# Patient Record
Sex: FEMALE | Race: WHITE
[De-identification: names, ages, dates, MRNs, and addresses within clinical notes are randomized per-mention and may not be internally consistent; named-entity substitution may affect disease eponyms.]

---

## 2018-12-30 ENCOUNTER — HOSPITAL ENCOUNTER (INPATIENT)
Dept: HOSPITAL 92 - ERS | Age: 78
LOS: 7 days | Discharge: HOSPICE | DRG: 64 | End: 2019-01-06
Attending: INTERNAL MEDICINE | Admitting: INTERNAL MEDICINE
Payer: MEDICARE

## 2018-12-30 VITALS — BODY MASS INDEX: 24.2 KG/M2

## 2018-12-30 DIAGNOSIS — Z66: ICD-10-CM

## 2018-12-30 DIAGNOSIS — N30.00: ICD-10-CM

## 2018-12-30 DIAGNOSIS — I13.2: ICD-10-CM

## 2018-12-30 DIAGNOSIS — E03.9: ICD-10-CM

## 2018-12-30 DIAGNOSIS — R00.1: ICD-10-CM

## 2018-12-30 DIAGNOSIS — N18.6: ICD-10-CM

## 2018-12-30 DIAGNOSIS — G93.41: ICD-10-CM

## 2018-12-30 DIAGNOSIS — Z86.73: ICD-10-CM

## 2018-12-30 DIAGNOSIS — R56.9: ICD-10-CM

## 2018-12-30 DIAGNOSIS — E85.4: ICD-10-CM

## 2018-12-30 DIAGNOSIS — Z91.15: ICD-10-CM

## 2018-12-30 DIAGNOSIS — Z99.2: ICD-10-CM

## 2018-12-30 DIAGNOSIS — D64.9: ICD-10-CM

## 2018-12-30 DIAGNOSIS — I50.33: ICD-10-CM

## 2018-12-30 DIAGNOSIS — E11.65: ICD-10-CM

## 2018-12-30 DIAGNOSIS — Z79.82: ICD-10-CM

## 2018-12-30 DIAGNOSIS — I63.9: Primary | ICD-10-CM

## 2018-12-30 DIAGNOSIS — Z90.49: ICD-10-CM

## 2018-12-30 DIAGNOSIS — I82.611: ICD-10-CM

## 2018-12-30 DIAGNOSIS — Z51.5: ICD-10-CM

## 2018-12-30 DIAGNOSIS — I24.8: ICD-10-CM

## 2018-12-30 DIAGNOSIS — I61.3: ICD-10-CM

## 2018-12-30 DIAGNOSIS — E11.22: ICD-10-CM

## 2018-12-30 DIAGNOSIS — I48.0: ICD-10-CM

## 2018-12-30 DIAGNOSIS — J96.01: ICD-10-CM

## 2018-12-30 DIAGNOSIS — Z79.4: ICD-10-CM

## 2018-12-30 DIAGNOSIS — E78.5: ICD-10-CM

## 2018-12-30 LAB
ALBUMIN SERPL BCG-MCNC: 3 G/DL (ref 3.4–4.8)
ALP SERPL-CCNC: 142 U/L (ref 40–150)
ALT SERPL W P-5'-P-CCNC: 25 U/L (ref 8–55)
ANALYZER IN CARDIO: (no result)
ANION GAP SERPL CALC-SCNC: 23 MMOL/L (ref 10–20)
APAP SERPL-MCNC: (no result) MCG/ML (ref 10–30)
APTT PPP: 24.7 SEC (ref 22.9–36.1)
AST SERPL-CCNC: 25 U/L (ref 5–34)
BACTERIA UR QL AUTO: (no result) HPF
BASE EXCESS STD BLDA CALC-SCNC: -2.8 MEQ/L
BASOPHILS # BLD AUTO: 0 THOU/UL (ref 0–0.2)
BASOPHILS NFR BLD AUTO: 0.3 % (ref 0–1)
BILIRUB SERPL-MCNC: 0.6 MG/DL (ref 0.2–1.2)
BUN SERPL-MCNC: 81 MG/DL (ref 9.8–20.1)
CA-I BLDA-SCNC: 1.26 MMOL/L (ref 1.12–1.3)
CALCIUM SERPL-MCNC: 8.4 MG/DL (ref 7.8–10.44)
CHLORIDE SERPL-SCNC: 98 MMOL/L (ref 98–107)
CK MB SERPL-MCNC: 7.3 NG/ML (ref 0–6.6)
CK SERPL-CCNC: 91 U/L (ref 29–168)
CO2 SERPL-SCNC: 20 MMOL/L (ref 23–31)
CREAT CL PREDICTED SERPL C-G-VRATE: 0 ML/MIN (ref 70–130)
CRYSTAL-AUWI FLAG: 0.2 (ref 0–15)
DRUG SCREEN CUTOFF: (no result)
EOSINOPHIL # BLD AUTO: 1.3 THOU/UL (ref 0–0.7)
EOSINOPHIL NFR BLD AUTO: 12.7 % (ref 0–10)
GLOBULIN SER CALC-MCNC: 2.6 G/DL (ref 2.4–3.5)
GLUCOSE SERPL-MCNC: 67 MG/DL (ref 83–110)
GLUCOSE UR STRIP-MCNC: 250 MG/DL
HCO3 BLDA-SCNC: 22.4 MEQ/L (ref 22–28)
HCT VFR BLDA CALC: 34 % (ref 36–47)
HEV IGM SER QL: 0 (ref 0–7.99)
HGB BLD-MCNC: 13 G/DL (ref 12–16)
HGB BLDA-MCNC: 11.7 G/DL (ref 12–16)
HYALINE CASTS #/AREA URNS LPF: (no result) LPF
INR PPP: 0.9
LIPASE SERPL-CCNC: 16 U/L (ref 8–78)
LYMPHOCYTES # BLD: 0.6 THOU/UL (ref 1.2–3.4)
LYMPHOCYTES NFR BLD AUTO: 5.6 % (ref 21–51)
MCH RBC QN AUTO: 31.1 PG (ref 27–31)
MCV RBC AUTO: 101 FL (ref 78–98)
MEDTOX CONTROL LINE VALID?: (no result)
MEDTOX READER #: (no result)
MONOCYTES # BLD AUTO: 0.9 THOU/UL (ref 0.11–0.59)
MONOCYTES NFR BLD AUTO: 9.2 % (ref 0–10)
NEUTROPHILS # BLD AUTO: 7.2 THOU/UL (ref 1.4–6.5)
NEUTROPHILS NFR BLD AUTO: 72.2 % (ref 42–75)
O2 A-A PPRESDIFF RESPIRATORY: 64.89 MM[HG] (ref 0–20)
PATHC CAST-AUWI FLAG: 9.74 (ref 0–2.49)
PCO2 BLDA: 40.3 MMHG (ref 35–45)
PH BLDA: 7.36 [PH] (ref 7.35–7.45)
PLATELET # BLD AUTO: 261 THOU/UL (ref 130–400)
PO2 BLDA: 112.9 MMHG (ref 70–?)
POTASSIUM BLD-SCNC: 3.9 MMOL/L (ref 3.7–5.3)
POTASSIUM SERPL-SCNC: 4.4 MMOL/L (ref 3.5–5.1)
PROT UR STRIP.AUTO-MCNC: 300 MG/DL
PROTHROMBIN TIME: 12.6 SEC (ref 12–14.7)
RBC # BLD AUTO: 4.18 MILL/UL (ref 4.2–5.4)
RBC # FLD AUTO: 0 /CUMM
RBC UR QL AUTO: (no result) HPF (ref 0–3)
SALICYLATES SERPL-MCNC: (no result) MG/DL (ref 15–30)
SODIUM SERPL-SCNC: 137 MMOL/L (ref 136–145)
SP GR UR STRIP: 1.01 (ref 1–1.04)
SPECIMEN DRAWN FROM PATIENT: (no result)
SPERM-AUWI FLAG: 0 (ref 0–9.9)
TROPONIN I SERPL DL<=0.01 NG/ML-MCNC: 0.06 NG/ML (ref ?–0.03)
TROPONIN I SERPL DL<=0.01 NG/ML-MCNC: 0.07 NG/ML (ref ?–0.03)
WBC # BLD AUTO: 9.9 THOU/UL (ref 4.8–10.8)
WBC # FLD MANUAL: 0 /CUMM
YEAST-AUWI FLAG: 0 (ref 0–25)

## 2018-12-30 PROCEDURE — 93306 TTE W/DOPPLER COMPLETE: CPT

## 2018-12-30 PROCEDURE — 86225 DNA ANTIBODY NATIVE: CPT

## 2018-12-30 PROCEDURE — 93970 EXTREMITY STUDY: CPT

## 2018-12-30 PROCEDURE — 96368 THER/DIAG CONCURRENT INF: CPT

## 2018-12-30 PROCEDURE — 36416 COLLJ CAPILLARY BLOOD SPEC: CPT

## 2018-12-30 PROCEDURE — 96375 TX/PRO/DX INJ NEW DRUG ADDON: CPT

## 2018-12-30 PROCEDURE — 84481 FREE ASSAY (FT-3): CPT

## 2018-12-30 PROCEDURE — 3E1M39Z IRRIGATION OF PERITONEAL CAVITY USING DIALYSATE, PERCUTANEOUS APPROACH: ICD-10-PCS | Performed by: INTERNAL MEDICINE

## 2018-12-30 PROCEDURE — 96361 HYDRATE IV INFUSION ADD-ON: CPT

## 2018-12-30 PROCEDURE — 81015 MICROSCOPIC EXAM OF URINE: CPT

## 2018-12-30 PROCEDURE — S0028 INJECTION, FAMOTIDINE, 20 MG: HCPCS

## 2018-12-30 PROCEDURE — 84484 ASSAY OF TROPONIN QUANT: CPT

## 2018-12-30 PROCEDURE — 80061 LIPID PANEL: CPT

## 2018-12-30 PROCEDURE — 96376 TX/PRO/DX INJ SAME DRUG ADON: CPT

## 2018-12-30 PROCEDURE — G0257 UNSCHED DIALYSIS ESRD PT HOS: HCPCS

## 2018-12-30 PROCEDURE — 84439 ASSAY OF FREE THYROXINE: CPT

## 2018-12-30 PROCEDURE — 80048 BASIC METABOLIC PNL TOTAL CA: CPT

## 2018-12-30 PROCEDURE — 83690 ASSAY OF LIPASE: CPT

## 2018-12-30 PROCEDURE — 71045 X-RAY EXAM CHEST 1 VIEW: CPT

## 2018-12-30 PROCEDURE — 87077 CULTURE AEROBIC IDENTIFY: CPT

## 2018-12-30 PROCEDURE — 85730 THROMBOPLASTIN TIME PARTIAL: CPT

## 2018-12-30 PROCEDURE — 96367 TX/PROPH/DG ADDL SEQ IV INF: CPT

## 2018-12-30 PROCEDURE — 94760 N-INVAS EAR/PLS OXIMETRY 1: CPT

## 2018-12-30 PROCEDURE — 84443 ASSAY THYROID STIM HORMONE: CPT

## 2018-12-30 PROCEDURE — 94640 AIRWAY INHALATION TREATMENT: CPT

## 2018-12-30 PROCEDURE — 70450 CT HEAD/BRAIN W/O DYE: CPT

## 2018-12-30 PROCEDURE — 82553 CREATINE MB FRACTION: CPT

## 2018-12-30 PROCEDURE — 87186 SC STD MICRODIL/AGAR DIL: CPT

## 2018-12-30 PROCEDURE — C1769 GUIDE WIRE: HCPCS

## 2018-12-30 PROCEDURE — 89051 BODY FLUID CELL COUNT: CPT

## 2018-12-30 PROCEDURE — G0365 VESSEL MAPPING HEMO ACCESS: HCPCS

## 2018-12-30 PROCEDURE — 70551 MRI BRAIN STEM W/O DYE: CPT

## 2018-12-30 PROCEDURE — 82805 BLOOD GASES W/O2 SATURATION: CPT

## 2018-12-30 PROCEDURE — 87040 BLOOD CULTURE FOR BACTERIA: CPT

## 2018-12-30 PROCEDURE — 93005 ELECTROCARDIOGRAM TRACING: CPT

## 2018-12-30 PROCEDURE — 85014 HEMATOCRIT: CPT

## 2018-12-30 PROCEDURE — 82140 ASSAY OF AMMONIA: CPT

## 2018-12-30 PROCEDURE — C1752 CATH,HEMODIALYSIS,SHORT-TERM: HCPCS

## 2018-12-30 PROCEDURE — 85049 AUTOMATED PLATELET COUNT: CPT

## 2018-12-30 PROCEDURE — 51703 INSERT BLADDER CATH COMPLEX: CPT

## 2018-12-30 PROCEDURE — 87340 HEPATITIS B SURFACE AG IA: CPT

## 2018-12-30 PROCEDURE — 95819 EEG AWAKE AND ASLEEP: CPT

## 2018-12-30 PROCEDURE — 85018 HEMOGLOBIN: CPT

## 2018-12-30 PROCEDURE — 87205 SMEAR GRAM STAIN: CPT

## 2018-12-30 PROCEDURE — 84100 ASSAY OF PHOSPHORUS: CPT

## 2018-12-30 PROCEDURE — 36556 INSERT NON-TUNNEL CV CATH: CPT

## 2018-12-30 PROCEDURE — 80307 DRUG TEST PRSMV CHEM ANLYZR: CPT

## 2018-12-30 PROCEDURE — 87086 URINE CULTURE/COLONY COUNT: CPT

## 2018-12-30 PROCEDURE — 83735 ASSAY OF MAGNESIUM: CPT

## 2018-12-30 PROCEDURE — 81003 URINALYSIS AUTO W/O SCOPE: CPT

## 2018-12-30 PROCEDURE — 36415 COLL VENOUS BLD VENIPUNCTURE: CPT

## 2018-12-30 PROCEDURE — 87070 CULTURE OTHR SPECIMN AEROBIC: CPT

## 2018-12-30 PROCEDURE — 95816 EEG AWAKE AND DROWSY: CPT

## 2018-12-30 PROCEDURE — 90945 DIALYSIS ONE EVALUATION: CPT

## 2018-12-30 PROCEDURE — 85025 COMPLETE CBC W/AUTO DIFF WBC: CPT

## 2018-12-30 PROCEDURE — 82550 ASSAY OF CK (CPK): CPT

## 2018-12-30 PROCEDURE — 83880 ASSAY OF NATRIURETIC PEPTIDE: CPT

## 2018-12-30 PROCEDURE — 80306 DRUG TEST PRSMV INSTRMNT: CPT

## 2018-12-30 PROCEDURE — 96365 THER/PROPH/DIAG IV INF INIT: CPT

## 2018-12-30 PROCEDURE — 70544 MR ANGIOGRAPHY HEAD W/O DYE: CPT

## 2018-12-30 PROCEDURE — 83605 ASSAY OF LACTIC ACID: CPT

## 2018-12-30 PROCEDURE — 85610 PROTHROMBIN TIME: CPT

## 2018-12-30 PROCEDURE — 86038 ANTINUCLEAR ANTIBODIES: CPT

## 2018-12-30 PROCEDURE — 80053 COMPREHEN METABOLIC PANEL: CPT

## 2018-12-30 PROCEDURE — 84146 ASSAY OF PROLACTIN: CPT

## 2018-12-30 RX ADMIN — INSULIN LISPRO PRN UNIT: 100 INJECTION, SOLUTION INTRAVENOUS; SUBCUTANEOUS at 23:18

## 2018-12-30 RX ADMIN — MEROPENEM SCH MLS: 1 INJECTION, POWDER, FOR SOLUTION INTRAVENOUS at 17:45

## 2018-12-30 NOTE — CON
DATE OF CONSULTATION:  2018



SERVICE:  Pulmonary Medicine.



REASON FOR CONSULT:  ICU patient.



HISTORY OF PRESENT ILLNESS:  The patient is a 78-year-old white female with past

medical history significant for end-stage renal disease, requiring peritoneal

dialysis.  She has been undergoing this at home on a nightly basis.  She was in 
her

usual state of health until she recently had a couple of large strokes and was

inpatient for 21 days at [facility].  She was discharged to rehabilitation

recently.  At the rehabilitation center, she was able to walk a few steps with

significant assistance.  Recently, she transitioned home.  When she got home, 
she

went to bed in her usual state of health.  In the morning, she could not be 
woken

up.  As such, she was subsequently brought back to the Emergency Department.  
She

cannot provide any additional elements of the history.  Multiple laboratories 
and

imaging studies have been performed so far. 



PAST MEDICAL HISTORY:  

1. Paroxysmal atrial fibrillation.

2. End-stage renal disease, on peritoneal dialysis.

3. Type 2 diabetes mellitus.

4. History of CVA.

5. Dyslipidemia.

6. Hypertension.

7. Hypothyroidism.



PAST SURGICAL HISTORY:  

1. Cataract surgery.

2. Kyphoplasty.

3. Right femur fracture repair.

4. Vertebroplasty.



FAMILY HISTORY:  Noncontributory.



SOCIAL HISTORY:  Negative for alcohol, tobacco, or illicit drug use.  She has no

exposure to chemicals, dust, asbestos, or tuberculosis. 



ALLERGIES:  IODINE.



MEDICATIONS:  List of her inpatient medications was reviewed.  No specific 
updates

were made at this time. 



REVIEW OF SYSTEMS:  Cannot be obtained as the patient has encephalopathy 
presently.



PHYSICAL EXAMINATION:

VITAL SIGNS:  Afebrile, pulse 53, blood pressure 135/57, respirations 14, and

saturation 100% on 2 L via the nasal cannula through nasal trumpet. 

HEENT:  Normocephalic and atraumatic.  Sclerae white.  Conjunctivae pink.  Oral

mucosa is moist and without lesions. 

LUNGS:  Decent air entry.  There is no prolonged expiratory phase or wheezing. 

HEART:  Normal rate, regular. 

ABDOMEN:  Soft.  No tenderness to palpation.  I do not appreciate rebound or

guarding.  Bowel sounds are hypoactive. 

GENITOURINARY:  Parrish catheter in place with very purulent sediment. 

MUSCULOSKELETAL:  No cyanosis or clubbing.  There is no pitting in the bilateral

lower extremities. 

NEUROLOGIC:  Pupils are equal, round, and reactive to light.  She does attend 
with

significant stimulation.  I witnessed the shaking spell that had an intermittent

contraction followed by relaxation, which was very quick.  It was predominantly

displayed in the right upper extremity, but seemed to go into the left upper

extremity as well.  She withdraws from noxious stimuli in a left lower 
extremity.

She did not withdraw from noxious stimuli in the right lower extremity.  
Babinski

are neutral.  She withdraws from noxious stimuli in the bilateral upper 
extremities.

 She is breathing comfortably.  She demonstrates a cough. 



LABORATORY DATA:  WBC 9.9, hemoglobin 13.0, and platelets 261,000.  Neutrophils 
are

72%.  Eosinophil level is 12.7%.  INR 0.9.  A pH of 7.36, pCO2 of 40, and pO2 of

112.  Creatinine 10.08, BUN 80, anion gap 23, bicarb 20.  Basic metabolic 
profile

and liver function studies are otherwise unremarkable.  , which is in

historic high.  TSH 5.7 and prolactin 918.  Lactate is unremarkable.  
Urinalysis is

positive for polyuria and leukocyte esterase.  Nitrites are negative, but there 
is

4+ bacteria.  Benzodiazepines are positive on the urine drug screen.  Alcohol,

acetaminophen, and salicylates are negative. 



IMAGIN. MRA of the head demonstrates no significant stenoses.

2. Chest x-ray demonstrates a right IJ that terminates in the right atrium.

Otherwise, I do not see any acute lung issues.  Cardiac silhouette is generous 
on

this portable film.  There are no overt consolidating changes.  There is no

significant pulmonary vascular congestion. 

3. CT of the brain demonstrates no acute intracranial abnormality.  Sinusitis 
is a

possibility here. 



ASSESSMENT:  

1. Severe sepsis.

2. Urinary tract infection, suspected.

3. Sinusitis.

4. Bradyarrhythmia.

5. Status epilepticus, suspected.

6. Hypothyroidism.

7. End-stage renal disease.

8. Hypereosinophilia.

9. Hypoglycemia.



DISCUSSION AND PLAN:  It is truly not clear what is going on.  I do believe we 
are

dealing with a seizure issue.  She has already been loaded with Keppra.  We are

going to need to give her some Ativan in order to calmed down these movement

changes.  We are going to schedule steroids q.6 hours for the elevated 
eosinophils.

This will be continued.  The patient basically had jocelin pyuria.  As such, a 
sample

is going to be sent off.  When we connect to peritoneal dialysis, we will send 
some

peritoneal fluid for evaluation as well.  I agree with empiric antibiotics.  I 
will

check a free T3 and a free T4 level.  Repeat a prolactin level in the morning 
to make certain that this

is going down.  I will add a magnesium phosphorus to morning laboratories.

Otherwise, supportive care is going to be 

continued.  Cardiology has put the patient on both dopamine, and dobutamine in 
order

to facilitate cardiac output and maintain a heart rate in the 50s.  Meropenem 
will

be initiated and all centrally acting medications will be interrupted.

Pulmonary/Critical Care will continue to follow very closely during this 
hospital

stay. 



70 minutes have been devoted to this patient in various activities.  I 
personally reviewed all imaging studies and laboratory data noted within this 
document.  For fifty percent of this time, I was interacting with the patient 
at the bedside or coordinating care with the care team.  For the remainder of 
the time I was immediately available to the patient in the hospital unit.  



Job ID:  [id number]



MTDD

## 2018-12-30 NOTE — CON
DATE OF CONSULTATION:  



TYPE OF CONSULTATION:  Nephrology.



REASON FOR CONSULTATION:  End-stage renal disease on maintenance peritoneal dialysis.



HISTORY OF PRESENT ILLNESS:  This is a 78-year-old female, who cannot give any

history, presented to the hospital with altered mentation.  The patient tolerated

dialysis well, done by her . 



PAST MEDICAL HISTORY:  Significant for diabetes mellitus.



REVIEW OF SYSTEMS:  Unobtainable. 

__________



ALLERGIES:  REVIEWED.



HOME MEDICATION LIST:  Unavailable.



PHYSICAL EXAMINATION:

GENERAL:  The patient is resting in mild-to-moderate distress. 

VITAL SIGNS:  Afebrile, pulse 70, breathing 16, and blood pressure 130/70. 

GENERAL APPEARANCE AND MENTAL STATUS:  Fair. 

HEAD/NECK:  Normocephalic.   Atraumatic. 

EYES:  EOMI.  No deformity. 

EARS:  Clear.  No ulcers. 

NOSE:   Intact.  No lesions. 

MOUTH:  Clear.  No discharge. 

THROAT:  Clear.  No exudate. 

LUNGS:   Clear.  No crackles. 

CARDIAC:   S1, S2.  No rub. 

ABDOMEN:   Benign.  Bowel sounds positive. 

GENITALIA/RECTUM:  Parrish absent. 

BACK/EXTREMITIES:  Edema 0+. 

NEUROLOGICAL:  The patient is comatose. 

SKIN:   

LYMPHATICS:



LABORATORY DATA:  Labs show hemoglobin 13.  Potassium is 4.4.



ASSESSMENT AND PLAN:  Stage 6 chronic kidney disease, plan peritoneal dialysis.

Hypertension stable.  Anemia stable.  Altered mental status, management per primary

team.  Prognosis is extremely poor. 







Job ID:  [id number]

## 2018-12-30 NOTE — RAD
PORTABLE AP CHEST XRAY:

 

DATE: 12/30/2018.

 

HISTORY: 

Altered mental status.  The patient came in unresponsive per EMS.

 

COMPARISON: 

9/18/2012.

 

FINDINGS: 

Two separate pacing pads overlie the right chest and centrally.  Cardiac silhouette is magnified by p
rojection but is enlarged.  Pulmonary vasculature is within normal limits.  Lungs are otherwise clear
.  Vascular calcification of the thoracic aorta.  Vertebroplasty changes are seen involving upper lum
bar vertebral body.  No other interval change.

 

IMPRESSION: 

1.  No acute cardiopulmonary process.

 

2.  Cardiomegaly.

 

POS: [facility]

## 2018-12-30 NOTE — RAD
CHEST 1 VIEW:

 

HISTORY: 

Central line placement.

 

COMPARISON: 

Earlier exam on the same date.

 

FINDINGS: 

The tip of a right internal jugular central venous catheter overlies the cavoatrial junction.  No justine
dence of pneumothorax.

 

Cardiomegaly and other findings are stable.

 

IMPRESSION: 

Right internal jugular central venous catheter is in good radiographic position.

 

POS: [facility]

## 2018-12-30 NOTE — MRI
MRA OF THE [place] OF RAI AND VERTEBRAL BASILAR SYSTEM WITH 3D RECONSTRUCTIONS:

 

Date: 12-30-18

 

History: Altered mental status. 

 

FINDINGS: 

There is mild motion artifact present on provided images. There is diminished flow related enhancemen
t involving the distal bilateral vertebral arteries, but this is a symmetric finding and is most like
ly artifactual. The distal vertebral arteries are otherwise codominant and patent bilaterally. The ba
silar artery and bilateral cerebral arteries are patent. Prominent posterior communicating artery on 
the right is present. Bilateral anterior cerebral and middle cerebral arteries appear patent. No foca
l stenosis or branch occlusion is identified. 

 

No aneurysm is seen within the limitations of the technique of this exam. There is an area of signal 
hyperintensity seen in the region of the sphenoid sinus. There is mucosal thickening present within t
he left sphenoid sinus with thickening of the walls of the left sphenoid sinus on CT examination and 
findings are likely related to chronic sinus disease. 

 

IMPRESSION: 

1. No focal stenosis or branch occlusion is seen involving the [place] or vertebral basilar 
system. 

2. Sinus disease involving the left sphenoid sinus, better visualized on noncontrast CT head. 

 

POS: [facility]

## 2018-12-30 NOTE — CON
DATE OF CONSULTATION:  



REASON FOR CONSULTATION:  Bradycardia.



HISTORY OF PRESENT ILLNESS:  Ms. De La Cruz is a unfortunate 78-year-old woman with a

history of end-stage renal disease.  According to her , she had a very large

stroke while at [facility].  She received a blood transfusion for her chronic

anemia and while she was there for history, she had a stroke. 



He states she was in rehab and recently came home.  He then states he found her

unresponsive.  He was unsure whether there were any focal neurologic deficits.  EMS

was summoned.  She was seen and evaluated in the emergency room by myself.  The

patient initially had a heart rate in the 20s to 30s.  She was placed on external

pacing with EMS.  Upon arrival to the ER, heart rate was in the 40s.  She did

respond to atropine.  Her rhythm appeared to be a narrow complex.  She was also

found to be in sinus. 



PAST MEDICAL HISTORY:  Paroxysmal atrial fibrillation, diabetes mellitus, previous

CVA, hyperlipidemia, hypertension, hypothyroidism, diabetes mellitus. 



PAST SURGICAL HISTORY:  Kyphoplasty, femoral fracture repair, vertebroplasty,

cataract surgery. 



SOCIAL HISTORY:  No current tobacco or alcohol use.



ALLERGIES:  IODINE.



REVIEW OF SYSTEMS:  Not obtainable.  She is currently obtunded.



PHYSICAL EXAMINATION:

VITAL SIGNS:  Blood pressure 134/54, pulse 77, temperature afebrile. 

GENERAL:  She is currently obtunded.  She does have difficulty opening up her eyes

to command.  She does follow commands, moving her right and left hand and lower

extremities, but appears markedly weak. 

NEUROLOGIC:  The patient is alert and oriented x3 with no focal neurologic deficits. 

HEENT:  Sclerae without icterus.  Mouth has moist mucous membranes with normal

pallor. 

NECK:  No JVD.  Carotid upstroke brisk.  No bruits bilaterally. 

LUNGS:  Clear to auscultation with unlabored respirations. 

BACK:  No scoliosis or kyphosis. 

CARDIAC:  Regular rate and rhythm with normal S1 and S2.  No S3 or S4 noted.  No

significant rubs, murmurs, thrills, or gallops noted throughout the precordium.  PMI

is not displaced.  There is no parasternal heave. 

ABDOMEN:  Soft, nontender, nondistended.  No peritoneal signs present.  No

hepatosplenomegaly.  No abnormal striae. 

EXTREMITIES:  2+ femoral and 2+ dorsalis pedis pulses.  No cyanosis, clubbing, or

edema. 

SKIN:  No gross abnormalities.



PERTINENT LABORATORY DATA:  Hemoglobin 13.0.  Peak troponin 0.072 and it is

downtrending.  Creatinine 10.08, CO2 is 20, CK-MB is 7.3. 



EKG shows sinus bradycardia with a narrow complex.



IMPRESSION:  

1. Bradycardia.

2. Mental status changes.

3. End-stage renal disease.

4. Paroxysmal atrial fibrillation.



RECOMMENDATIONS:  At this point, I would recommend adding dopamine and dobutamine.

She has a narrow complex and did respond to atropine.  She will likely respond to

dopamine and dobutamine.  At this point, I do not feel a temporary pacemaker is

indicated.  I would like to further assess why she is currently obtunded.  MRI

report is currently pending.  She was seen and evaluated in the emergency room.  The

 wishes for no heroic means or measures. 



I did spend 40 minutes of critical care time at the patient's bedside assessing the

patient's status. 







Job ID:  [id number]

## 2018-12-30 NOTE — CT
CT HEAD NONCONTRAST:

 

HISTORY: 

Altered mental status.

 

COMPARISON: 

3/14/2007.

 

FINDINGS: 

There is no evidence of acute intracranial hemorrhage or infarct.  Diffuse cortical atrophy and chron
ic ischemic small vessel disease have progressed since the 2007 exam.  There is no mass effect or jahaira
ft of midline structures.  Calcification in the arterial structures.  Partial mucosal opacification o
f the sphenoid sinus with cortical thickening consistent with acute upon chronic sphenoid sinusitis. 
 

 

IMPRESSION: 

No acute intracranial abnormalities are demonstrated.

 

 

 

 

Findings were called to Dr. Chavez in the emergency department by Dr. Vital at 0856 hours.

 

CODE CR

 

POS: [facility]

## 2018-12-30 NOTE — HP
REASON FOR ADMISSION:  Severe bradycardia, acute encephalopathy, demand ischemia,

possible seizure, and urinary tract infection. 



HISTORY OF PRESENTING ILLNESS:  Please note majority of this history is obtained by

talking to the patient's , Mr. Jono Veronica, as the patient is not conscious

at present.  She apparently was unresponsive this morning.  The  thought she

slept well after a long time, but then when he tried to wake her up this morning

around 8, the patient was not responding well.  He summoned EMS, and the patient was

brought here.  She was found to be bradycardic with heart rates dipping into 40s.

The patient initially was on transcutaneous pacer and now has been placed on

dobutamine and dopamine.  Per , the patient had two large strokes, which

affected her cognition and short-term memory with no specific paralysis as such.

This happened last month and was diagnosed at [facility].  She was at

inpatient rehab for 21 days and was discharged yesterday.  At the end of her rehab,

the patient was able to walk 8 to 10 steps with a rolling walker.  She was helping

with transfers as well.  The patient did not complain of any chest pain,

palpitation, PND, or orthopnea.  No complaints of fever, cough, or expectoration.

No urinary frequency or urgency yesterday. 



PAST MEDICAL AND SURGICAL HISTORY:  History of paroxysmal atrial fibrillation;

end-stage renal disease, on peritoneal dialysis from last one and half years;

diabetes mellitus, which is insulin dependent from last 38 years; prior stress test

was negative; history of CVA x2 last month; dyslipidemia; hypertension; cataract

surgery; hypothyroidism; right femur fracture with repair; kyphoplasty for vertebral

fractures; and thyroidectomy. 



CURRENT MEDICATIONS:  

1. Xanax 1 mg p.o. three times daily p.r.n.

2. Norvasc 5 mg daily.

3. Aspirin 81 mg p.o. daily.

4. Coreg 25 mg twice daily.

5. Effexor 37.5 mg extended release daily.

6. Fish oil daily.

7. Gabapentin 300 mg four times daily.

8. Levothyroxine 125 mcg p.o. daily.

9. Multivitamin one tablet once daily.

10. Trazodone 25 mg p.o. q.h.s.

11. Lantus 2 times a day.

12. Ferrous sulfate 150 mg daily.

13. Estradiol vaginal cream once a week.



ALLERGIES:  TO IODINE AND SHELLFISH.



PERSONAL HISTORY:  Does not abuse alcohol or drugs.  No history of smoking.



FAMILY HISTORY:  Mother  in her 80s.  She had coronary artery disease.  Father

had emphysema and also  in his 80s. 



CODE STATUS:  DNR.  This was discussed with , Mr. Jono Veronica, at bedside.



REVIEW OF SYSTEMS:  Cannot be obtained as the patient is not conscious.



PHYSICAL EXAMINATION:

GENERAL:  The patient is a 78-year-old female who is currently obtunded. 

VITAL SIGNS:  Blood pressure 150/86, pulse 50 per minute, respiratory rate 18 per

minute, saturating 98% on 2 L nasal cannula, and temperature 97.7 degrees rectal. 

NECK:  Supple.  No elevated JVD. 

HEENT:  Eyes; extraocular muscles intact.  Pupils reacting to light.  Oral cavity,

mucous membranes are dry.  No exudates or congestion. 

CARDIOVASCULAR SYSTEM:  S1 and S2 heard.  Bradycardic, regular rhythm. 

RESPIRATORY SYSTEM:  Air entry 1+ bilateral, scattered rhonchi plus no rales or

wheezes.  The patient has a trumpet placed in the right nostril. 

ABDOMEN:  Soft.  Bowel sounds heard.  No tenderness, rigidity, or guarding.

Peritoneal dialysis catheter in place. 

EXTREMITIES:  No peripheral edema or calf tenderness. 

VASCULAR SYSTEM:  Peripheral pulses 1+ bilateral.  No ischemic ulcerations or

gangrene. 

CENTRAL NERVOUS SYSTEM:  No gross focal signs seen.  The patient is currently

obtunded and a complete neurologic exam cannot be done. 

PSYCHIATRIC:  Cannot be assessed due to the patient is not conscious at present and

is obtunded. 



LABORATORY DATA:  EKG done showed sinus bradycardia at 54 beats per minute.  Urine

drug screen is positive for benzodiazepines.  White count of 9, H and H 13 and 42,

platelet count 261, MCV is 101 with 72% neutrophils.  PT, INR, PTT within normal

limits.  Blood gas done shows a pH of 7.36, pCO2 of 40, pO2 of 112, BUN 81,

creatinine 10, serum bicarb 20, potassium is 4.4, serum glucose is 67.  Liver

enzymes are within normal limits.  .  Albumin is 3.  TSH 5.6, prolactin is

918.  CK-MB 7.3, troponin I 0.07.  Ammonia levels are 29.  UA shows large leukocyte

esterase, greater than 50 wbc's, and 4+ bacteria. 



CLINICAL IMPRESSION AND PLAN:  The patient will be admitted to ICU for symptomatic

bradycardia, acute encephalopathy with current obtunded state, likely postictal with

elevated prolactin and recent two large cerebrovascular accidents.  She also has

demand ischemia and urinary tract infection in addition to above.  Pan cultures will

be obtained and we will obtain EEG stat.  Dr. Liang for Cardiology has been

consulted.  We will hold her Coreg for now in view of her bradycardia.  We will

obtain complete medication list from inpatient rehab, where she was recently

discharged.  She will be on ciprofloxacin for urinary tract infection.  We will

continue aspirin, fish oil, and Synthroid as before.  She will be on Humalog

moderate coverage for now, and the patient will be placed on Keppra 500 mg IV twice

daily for suspected seizure with elevated prolactin.  We will obtain MRI and MR

angiogram of the brain as well.  We will consult Dr. Mayi Spaulding for Neurology. 







Job ID:  [id number]

## 2018-12-30 NOTE — MRI
NONCONTRAST MRI BRAIN:

 

Date: 12-30-18

 

History: Altered mental status. Patient presented to the Emergency Department unresponsive. 

 

Comparison: CT head, 12-30-18

 

FINDINGS: 

There are patchy and confluent areas of increased FLAIR and T2 weighted signal intensity seen through
out the periventricular and subcortical white matter as well as increased T2 and FLAIR signal intensi
ty seen within the francesca. Findings are overall nonspecific but likely reflective of severe chronic sma
ll vessel ischemic changes. 

 

There is a linear focus of restricted diffusion seen within the posterior left frontal centrum semiov
rosa consistent with a small acute white matter infarction. A few punctate areas of increased signal i
ntensity are seen on diffusion weighted images within the biparietal lobes adjacent to the region of 
the atrium of the bilateral ventricles. There is probable restricted diffusion in these regions sugge
sting additional small white matter infarctions. There is a small area of restricted diffusion seen w
ithin the medial aspect of the left cerebellar hemisphere which also demonstrates mild restricted dif
fusion also suggesting an acute infarction. There is no evidence of an acute cortical infarction pres
ent. 

 

On gradient echo images, there are multifocal subcentimeter low signal intensity blooming artifacts p
resent diffusely throughout the cerebral hemispheres and in the left cerebellar hemisphere, and no co
rresponding abnormality is seen on the T2 weighted images. These findings are likely attributable to 
amyloid angiopathy. 

 

There is diffuse cerebral and cerebellar volume loss. The ventricular system is normal in size, shape
, and position for the degree of sulcal atrophy. 

 

Appropriate flow voids are demonstrated at the base of the brain. 

 

Mucosal thickening is present within the left sphenoid sinus and there is thickening of the walls of 
the left sphenoid sinus likely related to chronic sinus disease. Mild mucosal thickening is seen in t
he ethmoidal air cells. 

 

Native lenses are not visualized. 

 

IMPRESSION: 

1. Findings suggestive of embolic phenomenon with small acute infarctions in the left frontal corona 
radiata, right parietal white matter, as well as in the left cerebellar hemisphere compatible with sm
all areas of acute infarction. There is no acute cortical infarction seen. 

2. Findings most compatible with amyloid angiopathy. 

3. Diffuse cerebral and cerebellar volume loss. 

4. Sinus disease. 

 

POS: [facility]

## 2018-12-31 LAB
ANION GAP SERPL CALC-SCNC: 23 MMOL/L (ref 10–20)
BASOPHILS # BLD AUTO: 0 THOU/UL (ref 0–0.2)
BASOPHILS NFR BLD AUTO: 0.1 % (ref 0–1)
BUN SERPL-MCNC: 70 MG/DL (ref 9.8–20.1)
CALCIUM SERPL-MCNC: 8.7 MG/DL (ref 7.8–10.44)
CHLORIDE SERPL-SCNC: 95 MMOL/L (ref 98–107)
CO2 SERPL-SCNC: 20 MMOL/L (ref 23–31)
CREAT CL PREDICTED SERPL C-G-VRATE: 5 ML/MIN (ref 70–130)
EOSINOPHIL # BLD AUTO: 0 THOU/UL (ref 0–0.7)
EOSINOPHIL NFR BLD AUTO: 0.2 % (ref 0–10)
GLUCOSE SERPL-MCNC: 598 MG/DL (ref 83–110)
HGB BLD-MCNC: 11.6 G/DL (ref 12–16)
HGB BLD-MCNC: 13.1 G/DL (ref 12–16)
LYMPHOCYTES # BLD: 0.4 THOU/UL (ref 1.2–3.4)
LYMPHOCYTES NFR BLD AUTO: 5.9 % (ref 21–51)
MAGNESIUM SERPL-MCNC: 2.1 MG/DL (ref 1.6–2.6)
MCH RBC QN AUTO: 31.3 PG (ref 27–31)
MCV RBC AUTO: 103 FL (ref 78–98)
MONOCYTES # BLD AUTO: 0 THOU/UL (ref 0.11–0.59)
MONOCYTES NFR BLD AUTO: 0.3 % (ref 0–10)
NEUTROPHILS # BLD AUTO: 5.5 THOU/UL (ref 1.4–6.5)
NEUTROPHILS NFR BLD AUTO: 93.5 % (ref 42–75)
PLATELET # BLD AUTO: 219 THOU/UL (ref 130–400)
PLATELET # BLD AUTO: 231 THOU/UL (ref 130–400)
POTASSIUM SERPL-SCNC: 4.4 MMOL/L (ref 3.5–5.1)
RBC # BLD AUTO: 4.19 MILL/UL (ref 4.2–5.4)
SODIUM SERPL-SCNC: 134 MMOL/L (ref 136–145)
T4 FREE SERPL-MCNC: 1.07 NG/DL (ref 0.7–1.48)
WBC # BLD AUTO: 5.9 THOU/UL (ref 4.8–10.8)

## 2018-12-31 PROCEDURE — 3E1M39Z IRRIGATION OF PERITONEAL CAVITY USING DIALYSATE, PERCUTANEOUS APPROACH: ICD-10-PCS | Performed by: INTERNAL MEDICINE

## 2018-12-31 RX ADMIN — INSULIN LISPRO PRN UNIT: 100 INJECTION, SOLUTION INTRAVENOUS; SUBCUTANEOUS at 05:48

## 2018-12-31 RX ADMIN — FAMOTIDINE SCH MG: 10 INJECTION, SOLUTION INTRAVENOUS at 08:30

## 2018-12-31 RX ADMIN — INSULIN LISPRO PRN UNIT: 100 INJECTION, SOLUTION INTRAVENOUS; SUBCUTANEOUS at 07:10

## 2018-12-31 RX ADMIN — INSULIN LISPRO PRN UNIT: 100 INJECTION, SOLUTION INTRAVENOUS; SUBCUTANEOUS at 12:04

## 2018-12-31 RX ADMIN — MEROPENEM SCH MLS: 1 INJECTION, POWDER, FOR SOLUTION INTRAVENOUS at 01:22

## 2018-12-31 RX ADMIN — INSULIN GLARGINE SCH MLS: 100 INJECTION, SOLUTION SUBCUTANEOUS at 20:08

## 2018-12-31 NOTE — CON
DATE OF CONSULTATION:  



CHIEF COMPLAINT:  Seizures.



HISTORY OF PRESENT ILLNESS:  The patient is being admitted to the ICU today with

unresponsiveness. The patient was recently admitted for stroke a month ago and 
was

discharged to rehab, was in rehab for 21 days. She got home at yesterday and 
they

have not been able to get her up to walking yet. She spoke to both the new

caregivers and , had dinner, went to bed at the normal time. This morning
,

they disconnected from her peritoneal dialysis and then they were trying to 
wake her

up, but she has become nonresponsive.  called EMS immediately. EMS also 
could

not wake her up and has had some episodes since early December, where there was 
an

episode of possible seizure and she had 2

recent small strokes. The patient has been quite sick and she has had diabetes 
for

38 years and has attendant complications from diabetes. 



PAST MEDICAL HISTORY:  The patient has paroxysmal atrial fibrillation, end-stage

renal disease on peritoneal dialysis for the last 1-1/2 years, diabetes and has 
been

insulin dependent for the last 38 years. Prior stress test was negative. She has

history of CVA as discussed earlier for two strokes in the past month. She has

hyperlipidemia, hypertension, and hypothyroidism. 



PAST SURGICAL HISTORY:  Cataract surgery, kyphoplasty for vertebral fracture,

thyroidectomy, right femur fracture repair, and peritoneal dialysis catheter

implantation. 



MEDICATIONS:  At home, she takes:

1. Xanax.

2. Norvasc.

3. Aspirin.

4. Coreg.

5. Effexor.

6. Fish oil.

7. Gabapentin.

8. Levothyroxine.

9. Multivitamin.

10. Trazodone.

11. Lantus insulin.

12. Ferrous sulfate.

13. Estradiol.



ALLERGIES:  SHE IS ALLERGIC TO IODINE AND SHELLFISH.



FAMILY HISTORY:  Mother passed away in her 80s, had coronary artery disease. 
Father

had emphysema and passed away in his 80s. Brother is in his mid 80s now, but 
had a

CVA. No seizures in the family. 



REVIEW OF SYSTEMS:  Unobtainable.



LABORATORY DATA:  Current laboratory workup: White count 9.9, hemoglobin 13,

hematocrit 42, platelet count 261. Chemistry; sodium 137, potassium 4.4, 
chloride

98, bicarb 20, BUN 81, anion gap 23, creatinine 10.08, and glucose 67. BNP 
428.4.

TSH 5.6. Prolactin level is 918. Her glucose level has improved later on to 256
, and

troponin levels have been high, 0.079, 0.072, 0.059. CK-MB 7.3. MR angiogram, 
she

has no evidence of focal stenosis or branch occlusion and she has sinus disease 
on

the MR angio and her MRI of the brain showed acute infarcts in the left frontal

corona radiata, right parietal white matter, small focus in the left cerebellar

hemisphere and she also has multiple FLAIR and T2 weighted signal throughout the

periventricular subcortical white matter and within the francesca, likely reflective 
of

severe chronic small-vessel ischemic disease. There is also restricted 
diffusion in

the medial aspect of left cerebellar hemisphere and she has findings most 
compatible

with amyloid angiopathy and diffuse cerebral and cerebellar volume loss and her

chest x-ray was reviewed, and she has right IJ catheter in good radiographic

position, no pneumothorax, and chest x-ray here shows no acute cardiopulmonary

process. 



PHYSICAL EXAMINATION:

VITAL SIGNS: Blood pressure 135/57, pulse 53, and temperature 97.7. 

GENERAL APPEARANCE: She is not intubated, trying to breathe, has a nasal 
cannula in

place. 

CHEST: Clear vesicular breathing. 

CARDIOVASCULAR: S1 and S2. No murmurs. 

ABDOMEN: Nontender. 

NEUROLOGIC: She is sort of awake, tries to wake up and she closes her eyes 
again.

She tries to make an effort when following commands. Cranial nerves, pupils are 
3

mm, reactive bilaterally. No facial asymmetry noted and difficult to evaluate 
tongue

or oral cavity due to the patient's lack of effort. On motor exam, tone is 
increased

throughout. Strength 3/5 with reduced effort bilaterally and she has decreased

reflexes and involuntary movements. She has myoclonic activity intermittently 
noted

in the chin area, but also myoclonus in the extremities. 



IMPRESSION:  The patient with multiple medical problems and she was brought in 
with

history of unresponsiveness. She has end-stage renal disease, diabetes, and she 
has

91% O2 sats. Given in the ER, she was bradycardic in the 50s and pacing was 
began by

EMS at 70. Blood glucose was 87 at the time EMS saw her, and she remained

unresponsive and serum prolactin was obtained, but her prolactin levels are very

highly elevated. The question is whether she had persistent seizures and she is

postictal. At this time, MRI also shows multiple 3 areas of new strokes, likely

embolic in nature. This is just likely due to the cardiac event. At this time,

difficult to assess why the patient remains unresponsive, but she also has 
myoclonus

on exam, which is reflective of underlying metabolic disturbance. Likely

contributors to unresponsiveness are presence of low glucose levels plus hypoxia

possibly plus seizures and bradycardia and all contributing to her altered 
mental

status. 



TREATMENT PLAN:  I would like to add the sodium valproate in addition to Keppra 
to

help with myoclonus as well as seizures. She has completed echocardiogram to 
look

for embolic source such as a thrombus in the atrium or this could be

purely due to changes in her heart rate. Neurology will continue to follow this

patient. 







Job ID:  [id number]



MTDD

## 2018-12-31 NOTE — PRG
DATE OF SERVICE:  12/31/2018



SUBJECTIVE:  A 78-year-old female, being seen for end-stage renal disease, the

patient is resting. 



OBJECTIVE:  VITAL SIGNS:  Afebrile, pulse 80, breathing 16, blood pressure 142/60. 

Awake, alert, in no acute distress. 

GENERAL APPEARANCE AND MENTAL STATUS:  Fair. 

HEAD/NECK:  Normocephalic.   Atraumatic. 

EYES:  EOMI.  No deformity. 

EARS:  Clear.  No ulcers. 

NOSE:   Intact.  No lesions. 

MOUTH:  Clear.  No discharge. 

THROAT:  Clear.  No exudate. 

LUNGS:   Clear.  No crackles. 

CARDIAC:   S1, S2.  No rub. 

ABDOMEN:   Benign.  Bowel sounds positive. 

GENITALIA/RECTUM:  Parrish absent. 

BACK/EXTREMITIES:  Edema 0+. 

NEUROLOGICAL:  Alert and motor intact.                      

SKIN: 

LYMPHATICS:



LABORATORY DATA:  Labs show potassium 4.4, creatinine 9.15.



ASSESSMENT AND RECOMMENDATIONS:  

1. Stage 6 chronic kidney disease, continue hemodialysis.

2. Hypertension, stable.

3. Anemia, stable.

4. Hyperglycemia, management per Primary Team.







Job ID:  [id number]

## 2018-12-31 NOTE — PRG
DATE OF SERVICE:  12/31/2018



SUBJECTIVE:  Ms. De La Cruz remains in the CCU.  This morning, she is arousable.  She will

answer questions, but does not spontaneously conversant.  Her  was at the

bedside and I had an opportunity to talk to him significantly about her situation. 



OBJECTIVE:  VITAL SIGNS:  Temperature is 99.9, pulse 72, blood pressure 142/62, and

O2 saturation 99% on nasal cannula. 

HEENT:  Both pupils are reactive.  Sclerae are anicteric.  Oropharynx clear. 

NECK:  No adenopathy or JVD. 

LUNGS:  Coarse rhonchi bilaterally. 

CARDIOVASCULAR:  S1 and S2.  Regular. 

ABDOMEN:  Soft and nontender. 

EXTREMITIES:  No edema.  She moves all 4 extremities spontaneously. 

NEUROLOGIC:  She has a gag and answers simple questions appropriately.



LABORATORY DATA:  Sodium 134, potassium 4.4, chloride 95, CO2 of 20, BUN 70,

creatinine 9.1, and glucose 429.  White blood cell count 5.9, hematocrit 43, and

platelet count 231. 



ASSESSMENT:  

1. Status post probable seizure episode.

2. Embolic strokes.

3. Chronic renal failure.



PLAN:  

1. I would leave her in CCU another 24 hours.

2. We need to take measures to control her glucose.

3. Continue the meropenem for the time being.

4. The dobutamine and dopamine have been weaned off.

5. Neurology is seeing her for the seizures.  So far, she has not had any further 

seizure episodes since being loaded with anticonvulsants.

6. Her steroids have been weaned appropriately.

7. Discussed plan with .  He will need to have Case Management involved for

placement purposes. 







Job ID:  [id number]

## 2018-12-31 NOTE — PDOC.CTH
Cardiology Progress Note





- Subjective





No new issues. Remains confused. 





- Objective


 Vital Signs











  Pulse Ox


 


 12/31/18 07:40  99








 











Admit Weight                   145 lb


 


Weight                         145 lb 8.081 oz














 











 12/30/18 12/31/18 01/01/19





 06:59 06:59 06:59


 


Intake Total  1313.6 300


 


Output Total  203 115


 


Balance  1110.6 185














- Physical Examination


General/Neuro: NAD


Neck: no JVD present


Lungs: unlabored respirations


Heart: RRR


Abdomen: NT/ND


Extremities: other: (no edema)





- Telemetry


Telemetry Rhythm: nsr





- Labs


Result Diagrams: 


 12/31/18 12:44





 12/31/18 06:09


 Troponin/CKMB











CK-MB (CK-2)  7.3 ng/mL (0-6.6)  H*  12/30/18  08:49    


 


Troponin I  0.059 ng/mL (< 0.028)  H  12/30/18  14:42    














- Assessment/Plan





1. Bradycardia. Resolved.


2. Altered mental status


3. Possible seizure.


4. Paroxysmal afib


5. ESRD.


6. Acute CVA





PLAN:


- Will need long term anticoagulation for stroke prevention. 


- Continue supportive care for now. 


- Off innotropes now.

## 2018-12-31 NOTE — PDOC.PN
- Subjective


Encounter Start Date: 12/31/18


Encounter Start Time: 11:15


Subjective: awakens easily


-: is seen moving all extremities but very weak/shaking


-: no obvious seizures noted





- Objective


Resuscitation Status - Order Detail:





12/30/18 11:53


Resuscitation Status Routine 


   Resuscitation Status: DNAR: NO Resuscitation


   Discussed with: d/w POA  at bedside








MAR Reviewed: Yes


Vital Signs & Weight: 


 Vital Signs (12 hours)











  Temp Pulse Ox


 


 12/31/18 07:40   99


 


 12/31/18 03:00  98.7 F 








 Weight











Admit Weight                   2.328 oz


 


Weight                         145 lb 8.081 oz











 Most Recent Monitor Data











Heart Rate from ECG            67


 


NIBP                           160/70


 


NIBP BP-Mean                   100


 


Respiration from ECG           15


 


SpO2                           99














I&O: 


 











 12/30/18 12/31/18 01/01/19





 06:59 06:59 06:59


 


Intake Total  1313.6 200


 


Output Total  203 95


 


Balance  1110.6 105











Result Diagrams: 


 12/31/18 06:09





 12/31/18 06:09


Additional Labs: 


 Accuchecks











  12/31/18 12/31/18 12/31/18





  12:02 07:07 06:26


 


POC Glucose  206 H  429 H  495 H














  12/31/18 12/30/18 12/30/18





  05:48 23:17 17:40


 


POC Glucose  Greater than  550 H*  296 H  115 H














  12/30/18 12/30/18





  12:54 10:53


 


POC Glucose  256 H  69 L














Phys Exam





- Physical Examination


HEENT: PERRLA, moist MMs


Neck: no JVD, supple


Respiratory: no wheezing, no rales


Cardiovascular: RRR, no significant murmur


Gastrointestinal: soft, non-tender, positive bowel sounds


Musculoskeletal: no edema, pulses present


Neurological: non-focal, moves all 4 limbs





Dx/Plan


(1) Acute CVA (cerebrovascular accident)


Code(s): I63.9 - CEREBRAL INFARCTION, UNSPECIFIED   Status: Acute   Comment: 

embolic cva   





(2) Bradycardia


Code(s): R00.1 - BRADYCARDIA, UNSPECIFIED   Status: Resolved   Comment: off 

dopamine and dobutamine   





(3) Seizure


Code(s): R56.9 - UNSPECIFIED CONVULSIONS   Status: Suspected   





(4) ESRD (end stage renal disease) on dialysis


Code(s): N18.6 - END STAGE RENAL DISEASE; Z99.2 - DEPENDENCE ON RENAL DIALYSIS 

  Status: Chronic   Comment: on PD   





(5) Cerebral amyloid angiopathy


Code(s): E85.4 - ORGAN-LIMITED AMYLOIDOSIS; I68.0 - CEREBRAL AMYLOID ANGIOPATHY

   Status: Suspected   Comment: based on imaging   





(6) DM type 2 (diabetes mellitus, type 2)


Status: Chronic   


Qualifiers: 


   Diabetes mellitus long term insulin use: with long term use   Diabetes 

mellitus complication status: with kidney complications   Diabetes mellitus 

complication detail: with chronic kidney disease   Chronic kidney disease stage

: on chronic dialysis   Qualified Code(s): E11.22 - Type 2 diabetes mellitus 

with diabetic chronic kidney disease; N18.6 - End stage renal disease; Z79.4 - 

Long term (current) use of insulin; Z99.2 - Dependence on renal dialysis   





(7) HTN (hypertension)


Code(s): I10 - ESSENTIAL (PRIMARY) HYPERTENSION   Status: Chronic   


Qualifiers: 


   Hypertension type: essential hypertension   Qualified Code(s): I10 - 

Essential (primary) hypertension   





(8) Dyslipidemia


Code(s): E78.5 - HYPERLIPIDEMIA, UNSPECIFIED   Status: Chronic   





(9) UTI (urinary tract infection)


Status: Acute   


Qualifiers: 


   Urinary tract infection type: acute cystitis   Hematuria presence: without 

hematuria   Qualified Code(s): N30.00 - Acute cystitis without hematuria   





- Plan


is getting PD at night, start heparin cva protocol


-: MRI reveals embolic cva in left frontal, right parietal and left cerebellar


-: MRI suspicious for amyloid angiopathy


-: lantus 30ux1 then 15 u bid


-: on meropenem, keppra, depakote





* .








Review of Systems





- Medications/Allergies


Allergies/Adverse Reactions: 


 Allergies











Allergy/AdvReac Type Severity Reaction Status Date / Time


 


iodine Allergy   Verified 12/28/13 14:26


 


shellfish derived Allergy   Verified 12/28/13 14:26











Medications: 


 Current Medications





Acetaminophen (Tylenol)  650 mg PO Q4H PRN


   PRN Reason: Headache/Fever/Mild Pain (1-3)


Acetaminophen (Tylenol)  650 mg NH Q4H PRN


   PRN Reason: Headache/Fever/Mild Pain (1-3)


Aspirin (Aspirin)  300 mg NH DAILY [facility]


   Last Admin: 12/31/18 08:32 Dose:  300 mg


Dextrose/Water (Dextrose 50%)  25 gm SLOW IVP PRN PRN


   PRN Reason: Hypoglycemia


Docusate Sodium (Colace)  100 mg PO BID [facility]


   Last Admin: 12/31/18 08:33 Dose:  Not Given


Enoxaparin Sodium (Lovenox)  30 mg SC 0900 [facility]


   Last Admin: 12/31/18 08:32 Dose:  30 mg


Famotidine (Pepcid)  20 mg SLOW IVP DAILY [facility]


   Last Admin: 12/31/18 08:30 Dose:  20 mg


Fish Oil (Fish Oil)  1,000 mg PO BID [facility]


   Last Admin: 12/31/18 08:33 Dose:  Not Given


Glucagon (Glucagon)  1 mg IM PRN PRN


   PRN Reason: Hypoglycemia


Dextrose/Water (D5w)  1,000 mls @ 0 mls/hr IV .Q0M PRN


   PRN Reason: Hypoglycemia


Dobutamine HCl/Dextrose 500 mg (/ Device)  250 mls @ 0 mls/hr IVPB INF [facility]; 

Protocol


Dopamine HCl/Dextrose (Dopamine/D5w)  250 mls @ 0 mls/hr IVPB INF [facility]; Protocol


Sodium Chloride (1/2 Normal Saline)  1,000 mls @ 50 mls/hr IV .Q20H [facility]


   Last Admin: 12/31/18 12:07 Dose:  1,000 mls


Valproic Acid 500 mg/ Sodium (Chloride)  105 mls @ 100 mls/hr IVPB BID [facility]


   Last Admin: 12/31/18 08:33 Dose:  105 mls


Insulin Glargine 15 units/ (Miscellaneous Medication)  0.15 mls @ 0 mls/hr SC 

BID [facility]


Meropenem 1 gm/ Device  50 mls @ 100 mls/hr IVPB 0800 [facility]


Levetiracetam 250 mg/ Sodium (Chloride)  102.5 mls @ 205 mls/hr IVPB 0800,2000 

[facility]


   Last Admin: 12/31/18 08:35 Dose:  102.5 mls


Heparin Sodium/Dextrose (Heparin 25,000 Units/D5w 500 Ml)  500 mls @ 0 mls/hr 

IVPB INF [facility]; Protocol


Insulin Human Lispro (Humalog)  0 units SC .MODERATE SLIDING SC PRN


   PRN Reason: Moderate Correctional Scale


   Last Admin: 12/31/18 12:04 Dose:  4 unit


Levothyroxine Sodium (Synthroid)  150 mcg PO 0600 [facility]


   Last Admin: 12/31/18 05:04 Dose:  Not Given


Lorazepam (Ativan)  2 mg SLOW IVP Q15MIN PRN


   PRN Reason: Seizures


Methylprednisolone Sodium Succinate (Solu-Medrol)  40 mg IVP DAILY [facility]


Miscellaneous Medication (Pharmacy To Dose)  1 each IVPB PRN PRN


   PRN Reason: Pharmacy to dose


Senna/Docusate Sodium (Senokot S)  2 tab PO BID PRN


   PRN Reason: Constipation


Sodium Chloride (Flush - Normal Saline)  10 ml IVF PRN PRN


   PRN Reason: Saline Flush

## 2019-01-01 LAB
CHD RISK SERPL-RTO: 2.3 (ref ?–4.5)
CHOLEST SERPL-MCNC: 147 MG/DL
HDLC SERPL-MCNC: 64 MG/DL
LDLC SERPL CALC-MCNC: 68 MG/DL
TRIGL SERPL-MCNC: 76 MG/DL (ref ?–150)

## 2019-01-01 PROCEDURE — 3E1M39Z IRRIGATION OF PERITONEAL CAVITY USING DIALYSATE, PERCUTANEOUS APPROACH: ICD-10-PCS | Performed by: INTERNAL MEDICINE

## 2019-01-01 RX ADMIN — INSULIN LISPRO PRN UNIT: 100 INJECTION, SOLUTION INTRAVENOUS; SUBCUTANEOUS at 05:53

## 2019-01-01 RX ADMIN — MEROPENEM AND SODIUM CHLORIDE SCH MLS: 1 INJECTION, SOLUTION INTRAVENOUS at 07:44

## 2019-01-01 RX ADMIN — INSULIN GLARGINE SCH MLS: 100 INJECTION, SOLUTION SUBCUTANEOUS at 08:33

## 2019-01-01 RX ADMIN — Medication PRN ML: at 21:49

## 2019-01-01 RX ADMIN — FAMOTIDINE SCH MG: 10 INJECTION, SOLUTION INTRAVENOUS at 08:33

## 2019-01-01 NOTE — PDOC.PN
- Subjective


Encounter Start Date: 01/01/19


Encounter Start Time: 11:05





Ms. De La Cruz was seen today in follow-up of acute CVA and metabolic encephalopathy. 

She is awake and alert. She is less confused. She believes it is December ( it 

is January 1st, and that it is 2018). She thinks she is in a pharmacy. 





- Objective


Resuscitation Status - Order Detail:





12/30/18 11:53


Resuscitation Status Routine 


   Resuscitation Status: DNAR: NO Resuscitation


   Discussed with: d/w POA  at bedside








MAR Reviewed: Yes


Vital Signs & Weight: 


 Vital Signs (12 hours)











  Temp Pulse Ox


 


 01/01/19 08:00  98.3 F  98


 


 01/01/19 06:00   99








 Weight











Admit Weight                   145 lb


 


Weight                         145 lb 8.081 oz











 Most Recent Monitor Data











Heart Rate from ECG            62


 


NIBP                           149/68


 


NIBP BP-Mean                   95


 


Respiration from ECG           14


 


SpO2                           98














I&O: 


 











 12/31/18 01/01/19 01/02/19





 06:59 06:59 06:59


 


Intake Total 1313.6 1218.2 350


 


Output Total 203 194 5


 


Balance 1110.6 1024.2 345











Result Diagrams: 


 12/31/18 12:44





 12/31/18 06:09


Additional Labs: 


 Accuchecks











  01/01/19 12/31/18 12/31/18





  05:53 20:03 17:25


 


POC Glucose  271 H  245 H  109














  12/31/18





  12:02


 


POC Glucose  206 H














Phys Exam





- Physical Examination


HEENT: PERRLA, sclera anicteric


Respiratory: no wheezing, no rales, no rhonchi, clear to auscultation bilateral


Cardiovascular: RRR, no significant murmur, no rub


Gastrointestinal: soft, non-tender, no distention, positive bowel sounds


Musculoskeletal: pulses present, edema present


race pedal edema


Neurological: moves all 4 limbs


Deviation from normal: oriented to person





Dx/Plan


(1) Acute CVA (cerebrovascular accident)


Code(s): I63.9 - CEREBRAL INFARCTION, UNSPECIFIED   Status: Acute   Comment: 

embolic cva   





(2) Metabolic encephalopathy


Code(s): G93.41 - METABOLIC ENCEPHALOPATHY   Status: Acute   





(3) DM type 2 (diabetes mellitus, type 2)


Status: Chronic   


Qualifiers: 


   Diabetes mellitus long term insulin use: with long term use   Diabetes 

mellitus complication status: with kidney complications   Diabetes mellitus 

complication detail: with chronic kidney disease   Chronic kidney disease stage

: on chronic dialysis   Qualified Code(s): E11.22 - Type 2 diabetes mellitus 

with diabetic chronic kidney disease; N18.6 - End stage renal disease; Z79.4 - 

Long term (current) use of insulin; Z99.2 - Dependence on renal dialysis   





(4) ESRD (end stage renal disease) on dialysis


Code(s): N18.6 - END STAGE RENAL DISEASE; Z99.2 - DEPENDENCE ON RENAL DIALYSIS 

  Status: Chronic   Comment: on PD   





(5) HTN (hypertension)


Code(s): I10 - ESSENTIAL (PRIMARY) HYPERTENSION   Status: Chronic   


Qualifiers: 


   Hypertension type: essential hypertension   Qualified Code(s): I10 - 

Essential (primary) hypertension   





(6) Cerebral amyloid angiopathy


Code(s): E85.4 - ORGAN-LIMITED AMYLOIDOSIS; I68.0 - CEREBRAL AMYLOID ANGIOPATHY

   Status: Suspected   Comment: based on imaging   





(7) Bradycardia


Code(s): R00.1 - BRADYCARDIA, UNSPECIFIED   Status: Resolved   Comment: off 

dopamine and dobutamine   





- Plan





* Acute CVA- MRI results noted- she was noted to have multiple infarcts which 

appear to be embolic in nature


* She is currently on  a Heparin drip, and will need to be transitioned to an 

oral medication.


* Metabolic Encephalopathy- improving-  Possible acute seizure-  She is stable 

on Keppra


* Bradycardia- she has been weaned off Dopamine and Dobuatmine- her heart rate 

is stable


* She can be transitioned out of the ICU


* DM- blood glucose is slightly elevated- will decrease the dose of steroids


* HTN- blood pressure is slightly elevated- will monitor, and adjust 

medications as needed


* ESRD- on PD- plan is to possible transition to HD.

## 2019-01-01 NOTE — PRG
DATE OF SERVICE:  01/01/2019



SUBJECTIVE:  A 78-year-old female being seen for end-stage renal disease.  The

patient denies any nausea, vomiting, or chest pain. 



OBJECTIVE:  CONSTITUTIONAL:  The patient is awake and alert. 

VITAL SIGNS:  Afebrile.  Pulse 65, breathing 16, blood pressure 149/68. 

GENERAL APPEARANCE AND MENTAL STATUS:  Fair. 

HEAD/NECK:  Normocephalic.  Atraumatic. 

EYES:  EOMI.  No deformity. 

EARS:  Clear.  No ulcers. 

NOSE:  Intact.  No lesions. 

MOUTH:  Clear.  No discharge. 

THROAT:  Clear.  No exudate. 

LUNGS:  Clear.  No crackles. 

CARDIAC:  S1, S2.  No rub. 

ABDOMEN:  Benign.  Bowel sounds positive. 

GENITALIA/RECTUM:  Parrish absent. 

BACK/EXTREMITIES:  Edema 0+. 

NEUROLOGICAL:  Alert and motor intact.                      

SKIN: 

LYMPHATICS:



LABORATORY DATA:  Hemoglobin 11.6.



ASSESSMENT AND PLAN:  

1. Stage 6 chronic kidney disease, continue hemodialysis.

2. Hypertension, stable.

3. Anemia, stable.

4. Medication based on GFR appropriate.

5. Continue peritoneal dialysis.







Job ID:  [id number]

## 2019-01-01 NOTE — PRG
DATE OF SERVICE:  01/01/2019



SUBJECTIVE:  Alla De La Cruz is a 78-year-old female.  This morning, she is awake, alert,

responsive. 



OBJECTIVE:  VITAL SIGNS:  Pulse 61, blood pressure 142/69, saturations 90% on 2 L,

respiratory rate 20. 

GENERAL:  She is awake, moves all four extremities.  She is on IV heparin drip for

embolic CVA.   at the bedside states that she has had two CVAs in December.

She sees a [place] physician.  She has enterococcus in the urine, which is

sensitive to pretty much all the antibiotics. 

CHEST:  Otherwise decreased breath sounds.  Bilateral rhonchi. 

CARDIAC:  Normal S1, S2.  No gallops. 

ABDOMEN:  Soft without any mass.



IMPRESSION:  

1. Bradycardia, improved.

2. Atrial fibrillation.

3. Renal failure.

4. Cerebrovascular accident.

5. Severe deconditioning.

6. DNR.



PLAN:  She is on meropenem, steroids, Keppra, IV heparin. 



She is a DNI.  She can probably transferred out okay with Cardiology.







Job ID:  [id number]

## 2019-01-01 NOTE — PRG
DATE OF SERVICE:  01/01/2019



SUBJECTIVE:  Ms. De La Cruz had a bit of a restless night.  She became agitated and

confused.  She subsequently got some sleep and has been more lucid this morning.

Her  notes that she is a bit more conversant and appropriate, though she

continues to have confused thoughts about things that she is scheduled to have done.

 Her MRI of the brain revealed multiple small areas of ischemia involving both

anterior and posterior circulation.  She has had some documented atrial fibrillation

last month.  She is currently on a heparin drip along with aspirin.  Her 

does not report any definitive convulsive activity while at home.  She has had some

continued tremors and variable amounts of confusion.  Keppra was started yesterday.

She otherwise is without any focal complaints or focal findings on exam. 



Given the multiple areas of infarction and history of atrial fibrillation, I would

continue an anticoagulant for long-term management.  There is a history of

unresponsiveness, which could have been secondary to an acute stroke-related

seizure.  Continue the Keppra at 500 mg twice a day.  Hopefully with time, her

cognitive state will improve. 







Job ID:  [id number]

## 2019-01-01 NOTE — PDOC.CTH
Cardiology Progress Note





- Subjective





Mentation  better. More awake and alert. 





- Objective


 Vital Signs











  Temp Pulse Ox


 


 01/01/19 08:00  98.3 F  98


 


 01/01/19 06:00   99








 











Admit Weight                   145 lb


 


Weight                         145 lb 8.081 oz














 











 12/31/18 01/01/19 01/02/19





 06:59 06:59 06:59


 


Intake Total 1313.6 1218.2 350


 


Output Total 203 194 5


 


Balance 1110.6 1024.2 345














- Physical Examination


General/Neuro: alert & oriented x3, NAD


Neck: no JVD present


Lungs: CTA, unlabored respirations


Heart: RRR


Abdomen: NT/ND


Extremities: other: (no edema)





- Telemetry


Telemetry Rhythm: NSR





- Labs


Result Diagrams: 


 12/31/18 12:44





 12/31/18 06:09


 Troponin/CKMB











CK-MB (CK-2)  7.3 ng/mL (0-6.6)  H*  12/30/18  08:49    


 


Troponin I  0.059 ng/mL (< 0.028)  H  12/30/18  14:42    














- Assessment/Plan





1. Bradycardia. Resolved.


2. Altered mental status


3. Possible seizure.


4. Paroxysmal afib


5. ESRD.


6. Acute CVA


7. hyperglycemia.





PLAN:


- Will need long term anticoagulation for stroke prevention. Currently on 

heaprin drip.


- May transfer to floor from cardiac perspective.

## 2019-01-02 LAB
ANA SYMPHONY (QUANTITATIVE): (no result) RATIO
ANION GAP SERPL CALC-SCNC: 23 MMOL/L (ref 10–20)
BUN SERPL-MCNC: 62 MG/DL (ref 9.8–20.1)
CALCIUM SERPL-MCNC: 7.9 MG/DL (ref 7.8–10.44)
CHLORIDE SERPL-SCNC: 99 MMOL/L (ref 98–107)
CO2 SERPL-SCNC: 21 MMOL/L (ref 23–31)
CREAT CL PREDICTED SERPL C-G-VRATE: 6 ML/MIN (ref 70–130)
DSDNA IGG SERPL IA-ACNC: 1.1 IU/ML
DSDNA INTERPRETATION: (no result)
GLUCOSE SERPL-MCNC: 144 MG/DL (ref 83–110)
HGB BLD-MCNC: 12.3 G/DL (ref 12–16)
HGB BLD-MCNC: 12.3 G/DL (ref 12–16)
PLATELET # BLD AUTO: 237 THOU/UL (ref 130–400)
POTASSIUM SERPL-SCNC: 3.5 MMOL/L (ref 3.5–5.1)
SODIUM SERPL-SCNC: 139 MMOL/L (ref 136–145)

## 2019-01-02 PROCEDURE — 3E1M39Z IRRIGATION OF PERITONEAL CAVITY USING DIALYSATE, PERCUTANEOUS APPROACH: ICD-10-PCS | Performed by: INTERNAL MEDICINE

## 2019-01-02 RX ADMIN — INSULIN GLARGINE SCH MLS: 100 INJECTION, SOLUTION SUBCUTANEOUS at 10:42

## 2019-01-02 RX ADMIN — INSULIN LISPRO PRN UNIT: 100 INJECTION, SOLUTION INTRAVENOUS; SUBCUTANEOUS at 06:50

## 2019-01-02 RX ADMIN — MEROPENEM AND SODIUM CHLORIDE SCH MLS: 1 INJECTION, SOLUTION INTRAVENOUS at 10:41

## 2019-01-02 RX ADMIN — FAMOTIDINE SCH MG: 10 INJECTION, SOLUTION INTRAVENOUS at 10:42

## 2019-01-02 NOTE — HP
HISTORY OF PRESENT ILLNESS:  Alla De La Cruz is a 78-year-old female, admitted to this

hospitalization for neurological changes. CAT scan obtained on admission 12/30/2018,

revealing no acute changes. Subsequent 12/30/2018, same-day MRA/MRI  __________

revealed embolic changes with small acute infarcts in the left frontal lobe, right

parietal, white matter, as well as left cerebellar hemisphere, all consistent with

amyloid angiopathy and diffuse cerebral volume loss. The patient is a longstanding

peritoneal dialysis catheter patient. It is the age they are concerned that they

will not be able to continue peritoneal dialysis, and I have been asked to see her

regarding consideration of establishing a fistula. She has a history of severe

bradycardia, acute encephalopathy, demand ischemia, possible seizures, and UTI.

Finding the patient unresponsive on the day of admission, transferred by EMS to the

emergency room because of her prior history of two large strokes affecting her

cognition and short-term memory. Apparently, she was in inpatient rehab for 21 days

and was just discharged home a day prior to admission to this hospitalization. Her

Coreg was held and Dr. Liang saw her in consultation. Her bradycardia resolved.

Paroxysmal atrial fibrillation was noted. It was felt that she would need long-term

anticoagulation for stroke prevention. Currently, she is on aspirin, Colace, Pepcid,

insulin, levothyroxine, and meropenem. 



ALLERGIES:  IODINE AND SHELLFISH.



SOCIAL HISTORY:  Tobacco, none. Alcohol, none.



HOME MEDICATIONS:  Include

1. P.r.n. Compazine.

2. Ocuvite.

3. Trazodone.

4. Effexor XR.

5. MiraLAX.

6. Levothyroxine.

7. Insulin.

8. P.r.n. hydrocodone.

9. Zyrtec.

10. Coreg (discontinued this hospitalization due to bradycardia).

11. Aspirin.

12. Xanax.



PAST SURGICAL HISTORY:  Cholecystectomy, thyroid surgery, lumbar surgery, ORIF of

femur, and retinal surgery for retinopathy. 



PAST MEDICAL HISTORY:  History of strokes; history of paroxysmal atrial

fibrillation; end-stage renal disease, on peritoneal dialysis for the last one and

half years; insulin-dependent diabetes for 38 years; and cataract surgery. 



PHYSICAL EXAMINATION:

GENERAL: The patient is left handed. She is a DNR. 

VITAL SIGNS: 5 feet 5 inches, 145 pounds, BMI 24, temperature 98.5, pulse 69, blood

pressure 177/79. 

HEAD, EARS, EYES, NOSE AND THROAT: Unremarkable. 

LUNGS: Clear to auscultation. 

CARDIAC: Regular rate and rhythm. 

ABDOMEN: Soft. Peritoneal dialysis catheter placed. 

EXTREMITIES: Unremarkable.



LABORATORY DATA:  White count 5, hemoglobin 12. Sodium 139, potassium 3.5,

creatinine 8.36, GFR 5, glucose is 141 to 264, and magnesium 1.9. 



ASSESSMENT:  Peritoneal dialysis status end-stage renal disease. The patient has had

ultrasound vein mapping in both arms for dialysis access. This reveals her right

cephalic vein 2.6, 3.3, 3.6, 2.5 mm near the AC, basilic vein 3.4, 2.6, and 3.6.

There is extensive thrombus in the left cephalic vein at the level AC, not

accessible for access due to iatrogenic thrombosis. 



PLAN:  Right arm primary fistula. If native veins are not available for a native

fistula, then prosthetic will not be placed. We will plan this Friday. I tentatively

scheduled up today, but she had a fluctuating neurological status and this was held.

Currently, she is at her baseline neurological status and doing well. We would hold

anticoagulation until that time. 







Job ID:  [id number]

## 2019-01-02 NOTE — PDOC.PN
- Subjective


Encounter Start Date: 01/02/19


Encounter Start Time: 09:07





Ms. De La Cruz was seen today in follow-up of acute CVA. A code green was called this 

morning, as she was noted to have a decrease in her responsiveness, and her 

speech seemed more jumbled. Her nurse also noted that she continues to stare 

and did not seem to track. I responded to the code, and She did seem a little 

more confused than yeesterday, but she did focus on me when I spoke with her. 

She was able to answer some questions appropriately.





- Objective


Resuscitation Status - Order Detail:





12/30/18 11:53


Resuscitation Status Routine 


   Resuscitation Status: DNAR: NO Resuscitation


   Discussed with: d/w POA  at bedside








MAR Reviewed: Yes


Vital Signs & Weight: 


 Vital Signs (12 hours)











  Temp Pulse Resp BP Pulse Ox


 


 01/02/19 07:54  97.8 F  64  14  166/77 H  94 L


 


 01/02/19 04:00  98.3 F  63  24 H  107/74  92 L


 


 01/02/19 00:00  98.2 F  63  20  128/71  95








 Weight











Admit Weight                   145 lb


 


Weight                         145 lb 8.081 oz











 Most Recent Monitor Data











Heart Rate from ECG            67


 


NIBP                           154/78


 


NIBP BP-Mean                   103


 


Respiration from ECG           21


 


SpO2                           99














I&O: 


 











 01/01/19 01/02/19 01/03/19





 06:59 06:59 06:59


 


Intake Total 1218.2 850 


 


Output Total 194 80 


 


Balance 1024.2 770 











Result Diagrams: 


 12/31/18 12:44





 12/31/18 06:09


Additional Labs: 


 Accuchecks











  01/02/19 01/01/19 01/01/19





  05:51 20:41 17:08


 


POC Glucose  264 H  193 H  161 H














  01/01/19 01/01/19 01/01/19





  15:57 13:16 11:24


 


POC Glucose  58 L*  72  56 L*














Phys Exam





- Physical Examination


HEENT: PERRLA


Respiratory: no rales


+ rhonchi bilaterally and upper airway noise


Cardiovascular: RRR, no significant murmur, no rub


Gastrointestinal: soft, non-tender, no distention, positive bowel sounds


Musculoskeletal: pulses present, edema present


Neurological: moves all 4 limbs


+ intermittent dysarthria





Dx/Plan


(1) Acute CVA (cerebrovascular accident)


Code(s): I63.9 - CEREBRAL INFARCTION, UNSPECIFIED   Status: Acute   Comment: 

embolic cva   





(2) Metabolic encephalopathy


Code(s): G93.41 - METABOLIC ENCEPHALOPATHY   Status: Acute   





(3) DM type 2 (diabetes mellitus, type 2)


Status: Chronic   


Qualifiers: 


   Diabetes mellitus long term insulin use: with long term use   Diabetes 

mellitus complication status: with kidney complications   Diabetes mellitus 

complication detail: with chronic kidney disease   Chronic kidney disease stage

: on chronic dialysis   Qualified Code(s): E11.22 - Type 2 diabetes mellitus 

with diabetic chronic kidney disease; N18.6 - End stage renal disease; Z79.4 - 

Long term (current) use of insulin; Z99.2 - Dependence on renal dialysis   





(4) ESRD (end stage renal disease) on dialysis


Code(s): N18.6 - END STAGE RENAL DISEASE; Z99.2 - DEPENDENCE ON RENAL DIALYSIS 

  Status: Chronic   Comment: on PD   





(5) HTN (hypertension)


Code(s): I10 - ESSENTIAL (PRIMARY) HYPERTENSION   Status: Chronic   


Qualifiers: 


   Hypertension type: essential hypertension   Qualified Code(s): I10 - 

Essential (primary) hypertension   





(6) Cerebral amyloid angiopathy


Code(s): E85.4 - ORGAN-LIMITED AMYLOIDOSIS; I68.0 - CEREBRAL AMYLOID ANGIOPATHY

   Status: Suspected   Comment: based on imaging   





(7) Bradycardia


Code(s): R00.1 - BRADYCARDIA, UNSPECIFIED   Status: Resolved   Comment: off 

dopamine and dobutamine   





- Plan





* Acute CVA-Intermittent Worsening of her symptoms- She was sent for stat CT 

scan of the brain which was negative for bleed, or any significant change- will 

continue the Heparin drip for now- likely stroke in evolution


* Seizures- continue Keppra


* ESRD- plan is for AV- fistula placement for transition to HD from PD. 


* HTN- blood pressure is labile- Carvediolol is on Hold due to bradycardia- 

will continue Hydralazine PRN and monitor


* DM- also labile- will continue to monitor her on a SSI, and will likely add a 

low dose Lantus for the evening


* ESRD- continue PD 


* Chronic Anticoagulation- will continue Heparin drip. Given today's events 

will wait  another day or so to transition to an oral anticoagulant


* Bradycardia- heart rate has improved . .

## 2019-01-02 NOTE — CT
HEAD CT WITHOUT CONTRAST:

 

01/02/2019

 

HISTORY:

Stroke alert.  Aphasia and neglect to right side.  Slurred speech.

 

COMPARISON:

12/30/2018

 

TECHNIQUE:

Axial CT imaging obtained at 5 mm intervals, from the vertex through the skull base, without contrast
.

 

FINDINGS:

The imaged paranasal sinuses/mastoid air cells are well aerated.  There is no displaced calvarial fra
cture.

 

There is extensive periventricular, deep, and subcortical white matter hypodensity, evidence of small
 vessel disease.  Foci of hypodensity noted in the bilateral cerebellar hemispheres, suggesting areas
 of prior infarction.  No intracranial hemorrhage.  Recent brain MRI from 12/30/2018 demonstrated justine
dence of multifocal acute infarction.

 

IMPRESSION:

1.  No intracranial hemorrhage.

 

2.  Evidence of prominent small vessel disease.

 

If there is clinical concern for acute infarction, brain MRI advised.

 

Results discussed with Dr. Jordan at 8:26 a.m. on 01/02/2019.

 

CODE CR

 

POS: [facility]

## 2019-01-02 NOTE — ULT
ULTRASOUND VESSEL MAPPING DIALYSIS ACCESS:

 

HISTORY:

ESRD.

 

COMPARISON:

None.

 

TECHNIQUE:

Real-time gray-scale color Doppler and spectral analysis of the bilateral upper extremity arterial an
d venous system is performed.

 

FINDINGS:

RIGHT SIDE

BRACHIAL ARTERIAL:  4.4 mm

RADIAL ARTERY:      1.5 mm

ULNAR ARTERY:       1.9 mm

 

CEPHALIC VEIN:    2.6 mm

                    3.3 mm

                    3.6 mm

                    2.5 mm

                    1.2 mm

                    1.4 mm

                    1.4 mm

 

BASILIC VEIN:     3.4 mm

                    2.6 mm

                    3.6 mm

                    2.5 mm

                    1.1 mm

                    1.0 mm

                    1.4 mm

 

LEFT SIDE

BRACHIAL VEIN:    3.8 mm

RADIAL ARTERY:      1.6 mm

ULNAR ARTERY:       1.1 mm

 

There is thrombosis of the left cephalic vein at the level of the antecubital fossa.

 

The upper arm measures 2.1 mm.  The lower arm measures 1.2 mm.

 

IMPRESSION:

Vascular size as above.  There is thrombosis of the left cephalic vein, at the level of the antecubit
al fossa.

 

POS: [facility]

## 2019-01-02 NOTE — PDOC.CTH
Cardiology Progress Note





- Subjective





No overnight events. Had brainwave mapping study today and awaiting results. 

Spoke with  regarding care.





- Objective


 Vital Signs











  Temp Pulse Resp BP Pulse Ox


 


 01/02/19 11:59  98.6 F  63  14  174/83 H  97


 


 01/02/19 07:54  97.8 F  64  14  166/77 H  94 L


 


 01/02/19 04:00  98.3 F  63  24 H  107/74  92 L








 











Admit Weight                   145 lb


 


Weight                         145 lb 8.081 oz














 











 01/01/19 01/02/19 01/03/19





 06:59 06:59 06:59


 


Intake Total 1218.2 850 


 


Output Total 194 80 


 


Balance 1024.2 770 














- Physical Examination


Neck: no JVD present


Lungs: other: (coarse BS)


Heart: RRR


Abdomen: NT/ND





- Telemetry


Telemetry Rhythm: SR





- Labs


Result Diagrams: 


 01/02/19 12:43





 01/02/19 08:12


 Troponin/CKMB











CK-MB (CK-2)  7.3 ng/mL (0-6.6)  H*  12/30/18  08:49    


 


Troponin I  0.059 ng/mL (< 0.028)  H  12/30/18  14:42    














- Assessment/Plan





1. Bradycardia - resolved


2. Altered mental status


3. Possible seizure.


4. Paroxysmal afib


5. ESRD.


6. Recent acute CVA





Continue heparin gtt and current medications at this time. Awaiting input from 

neuro regarding long-term prognosis. No changes today from CV standpoint.

## 2019-01-02 NOTE — PRG
DATE OF SERVICE:  01/02/2019



SUBJECTIVE:  A 78-year-old female, being seen for end-stage renal disease.  The

patient denies any nausea, vomiting, or chest pain. 



OBJECTIVE:  CONSTITUTIONAL:  The patient is awake and alert. 

VITAL SIGNS:  Afebrile, pulse 64, breathing 16, blood pressure 166/77. 

GENERAL APPEARANCE AND MENTAL STATUS:  Fair. 

HEAD/NECK:  Normocephalic.   Atraumatic. 

EYES:  EOMI.  No deformity. 

EARS:  Clear.  No ulcers. 

NOSE:   Intact.  No lesions. 

MOUTH:  Clear.  No discharge. 

THROAT:  Clear.  No exudate. 

LUNGS:   Clear.  No crackles. 

CARDIAC:   S1, S2.  No rub. 

ABDOMEN:   Benign.  Bowel sounds positive. 

GENITALIA/RECTUM:  Parrish absent. 

BACK/EXTREMITIES:  Edema 0+. 

NEUROLOGICAL:  Alert and motor intact. 

SKIN: 

LYMPHATICS:



LABORATORY DATA:  None today.



ASSESSMENT AND PLAN:  

1. Stage 6 chronic kidney disease.  Continue peritoneal dialysis.

2. Hypertension, stable.

3. Anemia, stable.

4. We will order labs.  Per the patient's request, the patient is getting AV fistula

in case  cannot perform PD. 







Job ID:  [id number]

## 2019-01-03 PROCEDURE — 3E1M39Z IRRIGATION OF PERITONEAL CAVITY USING DIALYSATE, PERCUTANEOUS APPROACH: ICD-10-PCS | Performed by: INTERNAL MEDICINE

## 2019-01-03 RX ADMIN — INSULIN LISPRO PRN UNIT: 100 INJECTION, SOLUTION INTRAVENOUS; SUBCUTANEOUS at 05:44

## 2019-01-03 RX ADMIN — MEROPENEM AND SODIUM CHLORIDE SCH MLS: 1 INJECTION, SOLUTION INTRAVENOUS at 09:12

## 2019-01-03 RX ADMIN — INSULIN GLARGINE SCH MLS: 100 INJECTION, SOLUTION SUBCUTANEOUS at 09:13

## 2019-01-03 RX ADMIN — FAMOTIDINE SCH MG: 10 INJECTION, SOLUTION INTRAVENOUS at 09:13

## 2019-01-03 NOTE — PDOC.PN
- Subjective


Encounter Start Date: 01/03/19


Encounter Start Time: 11:51





Ms. De La Cruz was seen today in follow-up of acute CVA. She has not improved much 

overnight. Sheis very lethargic. she will awaken, but her voice is very weak, 

and she does not want to participate in PT. 





- Objective


Resuscitation Status - Order Detail:





12/30/18 11:53


Resuscitation Status Routine 


   Resuscitation Status: DNAR: NO Resuscitation


   Discussed with: d/w POA  at bedside








MAR Reviewed: Yes


Vital Signs & Weight: 


 Vital Signs (12 hours)











  Temp Pulse Pulse Pulse Resp BP BP


 


 01/03/19 10:36    77  79   214/78 H  202/77 H


 


 01/03/19 08:00  98.7 F  71    18  


 


 01/03/19 04:26  97.9 F  71    20  


 


 01/03/19 00:00  99.8 F H  69    20  














  BP Pulse Ox


 


 01/03/19 10:36  


 


 01/03/19 08:00  136/65  97


 


 01/03/19 04:26  186/88 H  94 L


 


 01/03/19 00:00  118/52 L  96








 Weight











Admit Weight                   145 lb


 


Weight                         145 lb 8.081 oz











 Most Recent Monitor Data











Heart Rate from ECG            67


 


NIBP                           154/78


 


NIBP BP-Mean                   103


 


Respiration from ECG           21


 


SpO2                           99














I&O: 


 











 01/02/19 01/03/19 01/04/19





 06:59 06:59 06:59


 


Intake Total 850  


 


Output Total 80  


 


Balance 770  











Result Diagrams: 


 01/02/19 12:43





 01/02/19 08:12


Additional Labs: 


 Accuchecks











  01/03/19 01/03/19 01/03/19





  11:40 05:35 00:39


 


POC Glucose  106  273 H  241 H














  01/02/19 01/02/19





  19:46 16:53


 


POC Glucose  127 H  57 L*














Phys Exam





- Physical Examination


HEENT: PERRLA


+ rhonchi and upper airway noise


Cardiovascular: RRR, no significant murmur, no rub


Gastrointestinal: soft, non-tender, no distention, positive bowel sounds


Musculoskeletal: no edema





Dx/Plan


(1) Acute CVA (cerebrovascular accident)


Code(s): I63.9 - CEREBRAL INFARCTION, UNSPECIFIED   Status: Acute   Comment: 

embolic cva   





(2) Metabolic encephalopathy


Code(s): G93.41 - METABOLIC ENCEPHALOPATHY   Status: Acute   





(3) DM type 2 (diabetes mellitus, type 2)


Status: Chronic   


Qualifiers: 


   Diabetes mellitus long term insulin use: with long term use   Diabetes 

mellitus complication status: with kidney complications   Diabetes mellitus 

complication detail: with chronic kidney disease   Chronic kidney disease stage

: on chronic dialysis   Qualified Code(s): E11.22 - Type 2 diabetes mellitus 

with diabetic chronic kidney disease; N18.6 - End stage renal disease; Z79.4 - 

Long term (current) use of insulin; Z99.2 - Dependence on renal dialysis   





(4) ESRD (end stage renal disease) on dialysis


Code(s): N18.6 - END STAGE RENAL DISEASE; Z99.2 - DEPENDENCE ON RENAL DIALYSIS 

  Status: Chronic   Comment: on PD   





(5) HTN (hypertension)


Code(s): I10 - ESSENTIAL (PRIMARY) HYPERTENSION   Status: Chronic   


Qualifiers: 


   Hypertension type: essential hypertension   Qualified Code(s): I10 - 

Essential (primary) hypertension   





(6) Cerebral amyloid angiopathy


Code(s): E85.4 - ORGAN-LIMITED AMYLOIDOSIS; I68.0 - CEREBRAL AMYLOID ANGIOPATHY

   Status: Suspected   Comment: based on imaging   





(7) Bradycardia


Code(s): R00.1 - BRADYCARDIA, UNSPECIFIED   Status: Resolved   Comment: off 

dopamine and dobutamine   





- Plan





* Acute CVA- she has extreme weakness as a result. She has a very weak swallow, 

and the Speech Therapist has recommended leaving her NPO


* Continue the Heparin drip


* HTN- blood pressure has been extremely labile- but mostly elevated- will add 

a Catapres patch- since she is now NPO, continue PRN Hydralazine, and Labetalol


* ESRD- continue PD- may need to consider postponing the placement of the AV 

fistula once again given her current fragile condition


* DM- blood glucose is stable


* Probable Seizures- continue Keppra.


* Condition is guarded

## 2019-01-03 NOTE — PRG
DATE OF SERVICE:  01/03/2019



SERVICE:  Pulmonary Medicine.



INTERVAL HISTORY:  The patient is doing okay from respiratory standpoint.  She is

breathing comfortably.  She has a little bit of paradoxical abdominal movement.

Otherwise, she is coughing.  She is not bringing up any sputum.  There has been a

little bit of interval change or improvement in her neurologic condition.  That

being said, today she demonstrates generalized asterixis. 



PHYSICAL EXAMINATION:

VITAL SIGNS:  Afebrile, pulse 73, blood pressure 207/92, respirations 14, and

saturation 96% on room air. 

GENERAL:  The patient is awake and alert.  No apparent distress. 

LUNGS:  Decent air entry.  Rhonchi and crackles are both present.  There is no

prolonged expiratory phase. 

HEART:  Normal rate, regular. 

ABDOMEN:  Soft, nontender, and nondistended.  Bowel sounds are positive. 

MUSCULOSKELETAL:  No cyanosis or clubbing.  There is diffuse 2+ edema throughout. 

GENITOURINARY:  Parrish catheter in place. 

NEUROLOGIC:  Grossly nonfocal.  She has diffuse weakness present throughout

bilateral upper and lower extremities, and once again demonstrates generalized

asterixis.  I do not see any of the movement disorder previously noted. 



LABORATORY DATA:  Hemoglobin 12.3.  Magnesium 1.6, phosphorus 8.2.  BUN 62.  Blood

sugar ranges from 57 to 241.  Peritoneal fluid was clear with no significant white

blood cells.  ALEKSANDRA screen was unremarkable.  Urine culture is growing Enterococcus

faecalis, which is pansensitive.  Blood cultures x2 and peritoneal fluid is sterile. 



IMAGING DATA:  

1. Echocardiogram demonstrates normal ejection fraction.  There is 1/3 diastolic

dysfunction.  Moderate concentric left ventricular hypertrophy is noted, but there

is no significant valvular disease. 

2. CT of the brain demonstrates no acute intracranial abnormality.



ASSESSMENT:  

1. Acute hypoxic respiratory failure.

2. End-stage renal disease, on peritoneal dialysis.

3. Generalized asterixis.

4. Acute on chronic diastolic heart failure, mostly secondary to volume overload

state. 



DISCUSSION AND PLAN:  I will interrupt her IV fluids.  We will continue working on

getting fluid off through peritoneal dialysis.  If the EEG is unremarkable, I would

be an advocate towards moving her to hemodialysis as the generalized asterixis may

be a component of unmeasured toxins that are creating her encephalopathy.  Pulmonary

will continue to follow along for now. 







Job ID:  [id number]

## 2019-01-03 NOTE — ULT
ULTRASOUND VESSEL MAPPING DIALYSIS ACCESSS:

 

Date:  01/02/19 

 

HISTORY:  

End-stage renal disease. 

 

COMPARISON:  

None. 

 

TECHNIQUE:  

Real-time Gray scale with color Doppler and spectral analysis of the bilateral upper extremity venous
 systems performed. 

 

FINDINGS:

 

There is partial thrombosis of the left cephalic vein at the antecubital fossa. 

 

RIGHT UPPER EXTREMITY

 

BRACHIAL ARTERY:  4.4 mm

RADIAL ARTERY:  1.5 mm

ULNAR ARTERY:  1.9 mm

 

CEPHALIC VEIN

Proximal Arm:  2.7 mm

Mid Arm:  3.3 mm

Distal Arm:  3.6 mm

Antecubital Fossa:  2.5 mm

Proximal Forearm:  1.2 mm

Mid Forearm:  1.4 mm

Distal Forearm:  1.4 mm

 

BASILIC VEIN

Proximal Arm:  3.4 mm

Mid Arm:  2.6 mm

Distal Arm:  3.6 mm

Antecubital Fossa:  2.5 mm

Proximal Forearm:  1.1 mm

Mid Forearm:  1.0 mm

Distal Forearm:  1.4 mm

 

 

LEFT UPPER EXTREMITY 

 

BRACHIAL ARTERY:  3.8 mm

RADIAL ARTERY:  1.6 mm

ULNAR ARTERY:  1.1 mm

 

BASILIC VEIN

Proximal Arm:  4.1 mm

Mid Arm:  3.1 mm

Distal Arm:  4.1 mm

Antecubital Fossa:  2.7 mm

Proximal Forearm:  1.8 mm

Mid Forearm:  1.9 mm

Distal Forearm:  1.5 mm

 

CEPHALIC VEIN

Upper Arm:  2.2 mm

Lower Arm:  1.0 mm

 

IMPRESSION: 

1.  Vascular sizes as above. 

2.  Partial thrombosis of the left cephalic vein at level of antecubital fossa. 

 

 

 

POS: [facility]

## 2019-01-03 NOTE — PRG
DATE OF SERVICE:  01/03/2019



SUBJECTIVE:  A 78-year-old female being seen for end-stage renal disease.  The

patient denies any nausea, vomiting, or chest pain. 



OBJECTIVE:  CONSTITUTIONAL:  The patient is awake and alert. 

VITAL SIGNS:  Afebrile, pulse __________ breathing 16, and blood pressure 136/65. 

GENERAL APPEARANCE AND MENTAL STATUS:  Fair. 

HEAD/NECK:  Normocephalic.  Atraumatic. 

EYES:  EOMI.  No deformity. 

EARS:  Clear.  No ulcers. 

NOSE:  Intact.  No lesions. 

MOUTH:  Clear.  No discharge. 

THROAT:  Clear.  No exudate. 

LUNGS:  Clear.  No crackles. 

CARDIAC:  S1, S2.  No rub. 

ABDOMEN:  Benign.  Bowel sounds positive. 

GENITALIA/RECTUM:  Parrish absent. 

BACK/EXTREMITIES:  Edema 0+. 

NEUROLOGICAL:  Alert and motor intact.



LABORATORY DATA:  Hemoglobin 12.3.



ASSESSMENT AND PLAN:  

1. Stage 6 chronic kidney disease.  Continue peritoneal dialysis.

2. Hypertension, stable.

3. Anemia, stable.

4. Access.  The patient is requesting hemodialysis access, which will be placed by

Dr. Sabillon. 







Job ID:  [id number]

## 2019-01-03 NOTE — PDOC.EVN
Event Note





- Event Note


Event Note: 





I spoke with the patient's son- in- law Dr. Gurmeet Garcia who is a physician over 

the phone regarding her condition. I also spoke with Dr. Cotter and Dr. Sabillon. 

She has been more lethargic over the past day or so. I am not entirely certain 

this is all due to the CVA. She has been on PD, and she may be inadequately 

dialyzed with the method.  After discussion with her son-in-law- the Plan is to 

consider trying a round or two of HD to see if she will improve.  If she does 

not improve, then her symptoms are likely all Neurologic, and placement of an 

AV fistula may not be necessary. I have also changed her antibiotic from 

Meropenem to Unasyn, to see if this may make a difference.

## 2019-01-03 NOTE — PDOC.CTH
Cardiology Progress Note





- Subjective





Chart and tele reviewed. No overnight events. BP labile and already addressed 

by primary team.  No further significant bradycardia.





- Objective


 Vital Signs











  Temp Pulse Pulse Pulse Resp BP BP


 


 01/03/19 11:56  98.7 F  73    14  


 


 01/03/19 10:36    77  79   214/78 H  202/77 H


 


 01/03/19 08:00  98.7 F  71    18  


 


 01/03/19 04:26  97.9 F  71    20  














  BP Pulse Ox


 


 01/03/19 11:56  207/92 H  96


 


 01/03/19 10:36  


 


 01/03/19 08:00  136/65  97


 


 01/03/19 04:26  186/88 H  94 L








 











Admit Weight                   145 lb


 


Weight                         145 lb 8.081 oz














 











 01/02/19 01/03/19 01/04/19





 06:59 06:59 06:59


 


Intake Total 850  


 


Output Total 80  


 


Balance 770  














- Physical Examination


General/Neuro: other: (Alert and awake)


Heart: RRR


Abdomen: NT/ND


Extremities: other: (no edema)





- Telemetry


Telemetry Rhythm: SB-NSR





- Labs


Result Diagrams: 


 01/02/19 12:43





 01/02/19 08:12


 Troponin/CKMB











CK-MB (CK-2)  7.3 ng/mL (0-6.6)  H*  12/30/18  08:49    


 


Troponin I  0.059 ng/mL (< 0.028)  H  12/30/18  14:42    














- Assessment/Plan





1. Bradycardia - lowest pulse 51bpm on tele now.


2. Altered mental status


3. Possible seizure.


4. Paroxysmal afib


5. ESRD.


6. Recent acute CVA


7. Labile HTN





No changes from my standpoint. BP already addressed by primary team. Needs 

decision regarding long term anticoagulation prior to discharge given history 

of paroxsymal AF and CVA. Will defer to neuro. Currently on Heparin gtt. Will 

see PRN. Please call if needed.

## 2019-01-04 LAB
ANION GAP SERPL CALC-SCNC: 21 MMOL/L (ref 10–20)
BUN SERPL-MCNC: 55 MG/DL (ref 9.8–20.1)
CALCIUM SERPL-MCNC: 7.5 MG/DL (ref 7.8–10.44)
CHLORIDE SERPL-SCNC: 101 MMOL/L (ref 98–107)
CO2 SERPL-SCNC: 20 MMOL/L (ref 23–31)
CREAT CL PREDICTED SERPL C-G-VRATE: 6 ML/MIN (ref 70–130)
GLUCOSE SERPL-MCNC: 250 MG/DL (ref 83–110)
HGB BLD-MCNC: 12.3 G/DL (ref 12–16)
PLATELET # BLD AUTO: 160 THOU/UL (ref 130–400)
POTASSIUM SERPL-SCNC: 3.3 MMOL/L (ref 3.5–5.1)
SODIUM SERPL-SCNC: 139 MMOL/L (ref 136–145)

## 2019-01-04 PROCEDURE — 05HN33Z INSERTION OF INFUSION DEVICE INTO LEFT INTERNAL JUGULAR VEIN, PERCUTANEOUS APPROACH: ICD-10-PCS | Performed by: SURGERY

## 2019-01-04 PROCEDURE — B544ZZA ULTRASONOGRAPHY OF LEFT JUGULAR VEINS, GUIDANCE: ICD-10-PCS | Performed by: SURGERY

## 2019-01-04 PROCEDURE — 5A1D70Z PERFORMANCE OF URINARY FILTRATION, INTERMITTENT, LESS THAN 6 HOURS PER DAY: ICD-10-PCS | Performed by: INTERNAL MEDICINE

## 2019-01-04 PROCEDURE — 0JH63XZ INSERTION OF TUNNELED VASCULAR ACCESS DEVICE INTO CHEST SUBCUTANEOUS TISSUE AND FASCIA, PERCUTANEOUS APPROACH: ICD-10-PCS | Performed by: SURGERY

## 2019-01-04 RX ADMIN — INSULIN GLARGINE SCH: 100 INJECTION, SOLUTION SUBCUTANEOUS at 15:45

## 2019-01-04 RX ADMIN — FAMOTIDINE SCH MG: 10 INJECTION, SOLUTION INTRAVENOUS at 10:12

## 2019-01-04 RX ADMIN — INSULIN LISPRO PRN UNIT: 100 INJECTION, SOLUTION INTRAVENOUS; SUBCUTANEOUS at 00:15

## 2019-01-04 RX ADMIN — INSULIN LISPRO PRN UNIT: 100 INJECTION, SOLUTION INTRAVENOUS; SUBCUTANEOUS at 18:03

## 2019-01-04 RX ADMIN — INSULIN LISPRO PRN UNIT: 100 INJECTION, SOLUTION INTRAVENOUS; SUBCUTANEOUS at 06:28

## 2019-01-04 NOTE — PRG
DATE OF SERVICE:  01/04/2019



SERVICE:  Pulmonary Medicine.



INTERVAL HISTORY:  The patient is doing okay from respiratory standpoint.  She 
is

breathing comfortably.  She does not provide any additional elements of the 
history

because of her encephalopathy.  She is not speaking currently.  That being said
, she

did not appear to have any significant distress.  Physical Therapy is working 
with

her currently.  She is not able to participate with extensive maneuvers, 
although

doing passive range of motion. 



PHYSICAL EXAMINATION:

VITAL SIGNS:  Afebrile with a T-max of 99.8, pulse 99, blood pressure 125/77,

respirations 18, and saturation 96% on 2 L nasal cannula. 

GENERAL:  The patient is somnolent.  She has her eyes closed.  With gentle

stimulation, she will open them up.  She will answer one or two word sentences, 
but

then drift off back to sleep without any stimulation. 

HEENT:  Normocephalic and atraumatic.  Sclerae are white.  Conjunctivae are 
pink.

Oral mucosa is moist without lesions. 

LUNGS:  Decent air entry.  Rhonchi and crackles are both present.  There is no

prolonged expiratory phase or wheezing present. 

HEART:  Normal rate, regular. 

ABDOMEN:  Soft, nontender, and nondistended.  Bowel sounds are positive. 

MUSCULOSKELETAL:  No cyanosis or clubbing.  There is diffuse 2+ pitting 
throughout,

which is more dramatically displayed in the pelvis and upper extremities.  She 
has

trace pitting in the lower extremities. 



LABORATORY DATA:  Creatinine 7.88 and downtrending gently and BUN 55, also

improving.  Anion gap 21, bicarb 20, potassium 3.3, and calcium 7.5.  Urine 
culture

is growing Enterococcus faecalis, which is pansensitive.  Body fluid culture 
from

the peritoneal effluent is negative.  Blood cultures x2 are unremarkable. 



ASSESSMENT:  

1. Acute hypoxic respiratory failure.

2. End-stage renal disease, on peritoneal dialysis.

3. Asterixis.

4. Metabolic encephalopathy, quite severe.

5. Acute on chronic diastolic heart failure, secondary to volume overload state.

6. Cerebral vascular accident.  



DISCUSSION AND PLAN:  The only thing that would be causing her generalized 
asterixis

at this point would be toxins that she would be cleared through the kidneys.  
She

does not appear to have any significant liver disease, and does not have

hypercapnia.  I believe that moving forward with a trial of hemodialysis to see 
if

she clears or encephalopathy is warranted.  Pulmonary/Critical Care will 
continue to

follow along while she remains in-house for now.  We will wean away oxygen as

tolerated. 







Job ID:  [id number]



MTDD

## 2019-01-04 NOTE — RAD
CHEST 1 VIEW:

 

HISTORY: 

Central line placement.

 

COMPARISON: 

Radiograph of 12/30/2018.

 

FINDINGS: 

Right IJ central venous catheter is in place in good position.  Dialysis catheter, new, on the left, 
is in place in good position.  No pneumothorax.  Mild edema.  Cardiomegaly.

 

IMPRESSION: 

1.  Uncomplicated placement of left internal jugular dialysis catheter.

 

2.  Severe degenerative change of right shoulder.

 

POS: [facility]

## 2019-01-04 NOTE — PDOC.CTH
Cardiology Progress Note





- Subjective





Called multiple times this morning before 0830 by nurses. Patient went into AF 

with RVR. Asymptomatic. Dig x 1 ordered, but not given as patient converted 

back to NSR after some time. Order for Amio 200mg BID given. Notified nurse 

patient was cleared for OR for HD access procedure, but if Heparin gtt needed 

to be stopped pre-op there is risk of CVA that family needed to be made aware 

of. Informed that I could not make rounds until later this afternoon to discuss 

this with them, but GS could pre-op. 





Now per nurse downstairs for procedure, but Amio not given as patient not 

taking oral meds. 





- Objective


 Vital Signs











  Temp Pulse Pulse Resp BP BP Pulse Ox


 


 01/04/19 11:47  98.5 F  71   18   168/79 H  93 L


 


 01/04/19 09:40    72   188/87 H  


 


 01/04/19 08:33        96


 


 01/04/19 07:48  99 F  112 H   18   125/77  96


 


 01/04/19 04:00  98.7 F  110 H   19   132/68  98














  Pulse Ox


 


 01/04/19 11:47 


 


 01/04/19 09:40  97


 


 01/04/19 08:33 


 


 01/04/19 07:48 


 


 01/04/19 04:00 








 











Admit Weight                   145 lb


 


Weight                         145 lb 8.081 oz














 











 01/03/19 01/04/19 01/05/19





 06:59 06:59 06:59


 


Intake Total  800 


 


Output Total  400 


 


Balance  400 














- Labs


Result Diagrams: 


 01/02/19 12:43





 01/04/19 05:59


 Troponin/CKMB











CK-MB (CK-2)  7.3 ng/mL (0-6.6)  H*  12/30/18  08:49    


 


Troponin I  0.059 ng/mL (< 0.028)  H  12/30/18  14:42    














- Assessment/Plan





1. Bradycardia


2. Altered mental status


3. Possible seizure.


4. Paroxysmal afib


5. ESRD.


6. Recent acute CVA


7. Labile HTN





Patient downstairs in OR for HD access, so not seen. Chart reviewed. Will 

monitor rhythm closely. Presented intially with significant bradycardia on 

Coreg that responded to dopamine. Has been off all rhythm/rate control since 

admit and SR until today. Will try low dose Amio to see if we can maintain NSR. 

Lower loading dose given history of mateus at admit. On Heparin gtt Continue to 

monitor rhythm over weekend.

## 2019-01-04 NOTE — PDOC.PN
- Subjective


Encounter Start Date: 01/04/19


Encounter Start Time: 15:47





Ms. De La Cruz was seen today in follow-up of acute CVA. She is still very lethargic. 

She has been sleeping most of the day today. 





- Objective


Resuscitation Status - Order Detail:





12/30/18 11:53


Resuscitation Status Routine 


   Resuscitation Status: DNAR: NO Resuscitation


   Discussed with: d/w POA  at bedside








MAR Reviewed: Yes


Vital Signs & Weight: 


 Vital Signs (12 hours)











  Temp Pulse Pulse Resp BP BP Pulse Ox


 


 01/04/19 11:47  98.5 F  71   18   168/79 H  93 L


 


 01/04/19 09:40    72   188/87 H  


 


 01/04/19 08:33        96


 


 01/04/19 07:48  99 F  112 H   18   125/77  96


 


 01/04/19 04:00  98.7 F  110 H   19   132/68  98














  Pulse Ox


 


 01/04/19 11:47 


 


 01/04/19 09:40  97


 


 01/04/19 08:33 


 


 01/04/19 07:48 


 


 01/04/19 04:00 








 Weight











Admit Weight                   145 lb


 


Weight                         145 lb 8.081 oz











 Most Recent Monitor Data











Heart Rate from ECG            67


 


NIBP                           154/78


 


NIBP BP-Mean                   103


 


Respiration from ECG           21


 


SpO2                           99














I&O: 


 











 01/03/19 01/04/19 01/05/19





 06:59 06:59 06:59


 


Intake Total  800 


 


Output Total  400 


 


Balance  400 











Result Diagrams: 


 01/02/19 12:43





 01/04/19 05:59


Additional Labs: 


 Accuchecks











  01/04/19 01/04/19 01/03/19





  06:00 00:04 16:52


 


POC Glucose  217 H  329 H  122 H














Phys Exam





- Physical Examination


HEENT: PERRLA


+ rhonchi and upper airway noise


Cardiovascular: RRR, no significant murmur, no rub


Gastrointestinal: soft, non-tender, no distention, positive bowel sounds


Musculoskeletal: pulses present, edema present





Dx/Plan


(1) Acute CVA (cerebrovascular accident)


Code(s): I63.9 - CEREBRAL INFARCTION, UNSPECIFIED   Status: Acute   Comment: 

embolic cva   





(2) Metabolic encephalopathy


Code(s): G93.41 - METABOLIC ENCEPHALOPATHY   Status: Acute   





(3) DM type 2 (diabetes mellitus, type 2)


Status: Chronic   


Qualifiers: 


   Diabetes mellitus long term insulin use: with long term use   Diabetes 

mellitus complication status: with kidney complications   Diabetes mellitus 

complication detail: with chronic kidney disease   Chronic kidney disease stage

: on chronic dialysis   Qualified Code(s): E11.22 - Type 2 diabetes mellitus 

with diabetic chronic kidney disease; N18.6 - End stage renal disease; Z79.4 - 

Long term (current) use of insulin; Z99.2 - Dependence on renal dialysis   





(4) ESRD (end stage renal disease) on dialysis


Code(s): N18.6 - END STAGE RENAL DISEASE; Z99.2 - DEPENDENCE ON RENAL DIALYSIS 

  Status: Chronic   Comment: on PD   





(5) HTN (hypertension)


Code(s): I10 - ESSENTIAL (PRIMARY) HYPERTENSION   Status: Chronic   


Qualifiers: 


   Hypertension type: essential hypertension   Qualified Code(s): I10 - 

Essential (primary) hypertension   





(6) Cerebral amyloid angiopathy


Code(s): E85.4 - ORGAN-LIMITED AMYLOIDOSIS; I68.0 - CEREBRAL AMYLOID ANGIOPATHY

   Status: Suspected   Comment: based on imaging   





(7) Bradycardia


Code(s): R00.1 - BRADYCARDIA, UNSPECIFIED   Status: Resolved   Comment: off 

dopamine and dobutamine   





- Plan





* Acute CVA- she has been very lethargic- will change her aspirin to per rectum

, until she is more alert


* Continue the Heparin drip


* ESRD- she has had the dialysis catheter placed- plan is to give a trial of 

Hemodialysis


* Metabolic encephalopathy- from CVA and or inadequate dialysis


* HTN- blood pressure is still elevated, but is trending down


* AFIB- her heart rate is controlled


* Hypothyroidism- can hold leveothyroxin safely for a few days


* DM- She has not been alert enough to take anything by mouth- will cover with 

SSI only for now.

## 2019-01-04 NOTE — OP
DATE OF PROCEDURE:  01/04/2019



PREOPERATIVE DIAGNOSES:  End-stage renal disease, peritoneal dialysis status, recent

ischemic stroke on heparin drip with compromised neurological status. 



POSTOPERATIVE DIAGNOSES:  End-stage renal disease, peritoneal dialysis status,

recent ischemic stroke on heparin drip with compromised neurological status. 



PROCEDURES PERFORMED:  Left internal jugular cuffed tunneled hemodialysis catheter,

ultrasound and fluoroscopy used. 



ANESTHESIA:  TIVA, local of 0.5% Marcaine with epinephrine 30 mL mixed with 2%

Xylocaine 10 mL. 



INDICATIONS:  Initially, I was asked to see her regarding the AV fistula in right

arm for backup, but as her neurological condition worsen, the family decided to

proceed with a hemodialysis catheter to try hemodialysis to see if her neurological

status would improve and hoping that she will improve. 



DESCRIPTION OF PROCEDURE:  The patient was taken to the operating room, where under

intravenous sedation, neck and chest were prepared with ChloraPrep and draped in

routine fashion.  Local anesthetic was infiltrated in the skin and subcutaneous

tissue about the operative site.  Using ultrasound guidance, left internal jugular

vein was cannulated with a trocar catheter and J-wire threaded, trocar catheter

removed.  Skin was incised and enlarged sharply.  Fluoroscopic images revealed the

wire to be in the superior vena cava.  Stab incision was made over the left chest.

Using a tunneling device, pre-curved Angio[employer] cuffed-tunneled hemodialysis

catheter tunneled between the two incisions, placed in the fabric cuff beneath the

skin exit site.  Catheter was secured with 2 interrupted sutures of 3-0 nylon and

sterile dressings applied.  Small and medium size dilators were placed over the

J-wire into the internal jugular vein under fluoroscopic visualization.  Dilator and

peel-away sheath placed over the J-wire into the superior vena cava and dilator and

J-wire removed.  Catheter was placed through the Peel-Away sheath.  Peel-Away sheath

removed.  Platysma was approximated with 4-0 

Monocryl, skin with subdermal 4-0 Monocryl and Derma glue.  Sterile dressing was

applied.  Each port aspirated blood and flushed with saline solution and flushed

with heparinized saline solution with 1000 units heparin per mL indicating volume of

the port.  Fluoroscopic images revealed good line placement. 







Job ID:  [id number]

## 2019-01-04 NOTE — PRG
DATE OF SERVICE:  01/04/2019



SUBJECTIVE:  This is a 78-year-old female being seen for end-stage kidney disease.

The patient denies any nausea, vomiting, or chest pain. 



OBJECTIVE:  CONSTITUTIONAL:  The patient is awake, alert. 

VITAL SIGNS:  Afebrile, pulse 71, breathing 16, blood pressure 120/77. 

GENERAL APPEARANCE AND MENTAL STATUS:  Fair. 

HEAD/NECK:  Normocephalic.  Atraumatic. 

EYES:  EOMI.  No deformity. 

EARS:  Clear.  No ulcers. 

NOSE:  Intact.  No lesions. 

MOUTH:  Clear.  No discharge. 

THROAT:  Clear.  No exudate. 

LUNGS:  Clear.  No crackles. 

CARDIAC:  S1, S2.  No rub. 

ABDOMEN:  Benign.  Bowel sounds positive. 

GENITALIA/RECTUM:  Parrish absent. 

BACK/EXTREMITIES:  Edema 0+. 

NEUROLOGICAL:  Alert and motor intact.



LABORATORY DATA:  Showed hemoglobin 12.3, potassium 3.3, creatinine 7.8.



ASSESSMENT AND PLAN:  

1. Stage 6 chronic kidney disease.  Continue peritoneal dialysis.

2. Hypertension, stable.

3. Anemia, stable.

4. Altered mental status.  We will give hemodialysis a try as there could be

possible uremia. 







Job ID:  [id number]

## 2019-01-05 LAB
ANION GAP SERPL CALC-SCNC: 19 MMOL/L (ref 10–20)
BUN SERPL-MCNC: 38 MG/DL (ref 9.8–20.1)
CALCIUM SERPL-MCNC: 7.9 MG/DL (ref 7.8–10.44)
CHLORIDE SERPL-SCNC: 102 MMOL/L (ref 98–107)
CO2 SERPL-SCNC: 22 MMOL/L (ref 23–31)
CREAT CL PREDICTED SERPL C-G-VRATE: 7 ML/MIN (ref 70–130)
GLUCOSE SERPL-MCNC: 205 MG/DL (ref 83–110)
HBSAG INDEX: 0.18 S/CO (ref 0–0.99)
POTASSIUM SERPL-SCNC: 3.7 MMOL/L (ref 3.5–5.1)
SODIUM SERPL-SCNC: 139 MMOL/L (ref 136–145)

## 2019-01-05 PROCEDURE — 5A1D70Z PERFORMANCE OF URINARY FILTRATION, INTERMITTENT, LESS THAN 6 HOURS PER DAY: ICD-10-PCS | Performed by: INTERNAL MEDICINE

## 2019-01-05 RX ADMIN — INSULIN LISPRO PRN UNIT: 100 INJECTION, SOLUTION INTRAVENOUS; SUBCUTANEOUS at 12:46

## 2019-01-05 RX ADMIN — FAMOTIDINE SCH MG: 10 INJECTION, SOLUTION INTRAVENOUS at 08:37

## 2019-01-05 RX ADMIN — Medication PRN ML: at 08:37

## 2019-01-05 RX ADMIN — INSULIN GLARGINE SCH: 100 INJECTION, SOLUTION SUBCUTANEOUS at 09:23

## 2019-01-05 NOTE — PDOC.PN
- Subjective


Encounter Start Date: 01/05/19


Encounter Start Time: 12:57





Ms. De La Cruz was seen today in follow-up of acute CVA and metabolic encephalopathy. 

She is much more alert this afternoon. Her  tells me she started 

becoming more alert last night. 





- Objective


Resuscitation Status - Order Detail:





12/30/18 11:53


Resuscitation Status Routine 


   Resuscitation Status: DNAR: NO Resuscitation


   Discussed with: d/w POA  at bedside








MAR Reviewed: Yes


Vital Signs & Weight: 


 Vital Signs (12 hours)











  Temp Pulse Resp BP BP Pulse Ox


 


 01/05/19 12:00  99.1 F  81  20  178/78 H   97


 


 01/05/19 08:00  99.4 F  82  20  170/73 H   93 L


 


 01/05/19 05:40   70   153/68 H  


 


 01/05/19 04:00  100.4 F H  67  19   195/84 H  97








 Weight











Admit Weight                   145 lb


 


Weight                         145 lb 8.081 oz











 Most Recent Monitor Data











Heart Rate from ECG            67


 


NIBP                           154/78


 


NIBP BP-Mean                   103


 


Respiration from ECG           21


 


SpO2                           99














I&O: 


 











 01/04/19 01/05/19 01/06/19





 06:59 06:59 06:59


 


Intake Total 800  


 


Output Total 400 100 


 


Balance 400 -100 











Result Diagrams: 


 01/04/19 23:13





 01/05/19 06:35


Additional Labs: 


 Accuchecks











  01/05/19 01/05/19 01/05/19





  12:32 05:46 00:05


 


POC Glucose  220 H  159 H  67 L














  01/04/19





  17:01


 


POC Glucose  251 H














Phys Exam





- Physical Examination


HEENT: PERRLA


+ rhonchi bilaterally


Cardiovascular: RRR, no significant murmur, no rub, irregular


Gastrointestinal: soft, non-tender, no distention


Musculoskeletal: pulses present, edema present





Dx/Plan


(1) Acute CVA (cerebrovascular accident)


Code(s): I63.9 - CEREBRAL INFARCTION, UNSPECIFIED   Status: Acute   Comment: 

embolic cva   





(2) Metabolic encephalopathy


Code(s): G93.41 - METABOLIC ENCEPHALOPATHY   Status: Acute   





(3) DM type 2 (diabetes mellitus, type 2)


Status: Chronic   


Qualifiers: 


   Diabetes mellitus long term insulin use: with long term use   Diabetes 

mellitus complication status: with kidney complications   Diabetes mellitus 

complication detail: with chronic kidney disease   Chronic kidney disease stage

: on chronic dialysis   Qualified Code(s): E11.22 - Type 2 diabetes mellitus 

with diabetic chronic kidney disease; N18.6 - End stage renal disease; Z79.4 - 

Long term (current) use of insulin; Z99.2 - Dependence on renal dialysis   





(4) ESRD (end stage renal disease) on dialysis


Code(s): N18.6 - END STAGE RENAL DISEASE; Z99.2 - DEPENDENCE ON RENAL DIALYSIS 

  Status: Chronic   Comment: on PD   





(5) HTN (hypertension)


Code(s): I10 - ESSENTIAL (PRIMARY) HYPERTENSION   Status: Chronic   


Qualifiers: 


   Hypertension type: essential hypertension   Qualified Code(s): I10 - 

Essential (primary) hypertension   





(6) Cerebral amyloid angiopathy


Code(s): E85.4 - ORGAN-LIMITED AMYLOIDOSIS; I68.0 - CEREBRAL AMYLOID ANGIOPATHY

   Status: Suspected   Comment: based on imaging   





(7) Bradycardia


Code(s): R00.1 - BRADYCARDIA, UNSPECIFIED   Status: Resolved   Comment: off 

dopamine and dobutamine   





- Plan





* Acute CVA- continue Heparin drip for now, and will change aspirin back to p.o.

, and lower the dose to 81 mg, since she is on the heparin drip


* Continue statin, now that she is tolerating p.o.


* Metabolic encephalopathy- improved


* ESRD- she tolerated HD , in place of PD- will continue with HD as per 

Nephrologist


* HTN- blood pressure is better


* Dysphagia- she was evaluated by speech therapy, and been cleared for nectar 

thick liquids


* If she continues to tolerate p.o. can change the heparin drip to an oral 

anticoagulant, likely next week, if she proceeds with AV fistula placement.

## 2019-01-05 NOTE — PRG
DATE OF SERVICE:  01/05/2019



SUBJECTIVE:  The patient is awake, alert, doing well today.



OBJECTIVE:  VITAL SIGNS:  Temperature 99.4 with a T-max of 100.4, pulse 82,

respirations 20, O2 saturation 93% on 2 L nasal cannula, and blood pressure 170/73. 

GENERAL:  She is awake, alert, no distress. 

HEENT:  Unremarkable. 

NECK:  No JVD. 

LUNGS:  Fairly clear without wheezing or rhonchi. 

CARDIAC:  S1, S2 regular. 

ABDOMEN:  Soft. 

EXTREMITIES:  No edema.



LABORATORY DATA:  Sodium 139, potassium 3.7, BUN 38, creatinine 6.6, glucose 205.



ASSESSMENT:  

1. Acute hypoxic respiratory failure, which is improved, but still requiring

low-flow nasal cannula. 

2. End-stage renal disease, requiring peritoneal dialysis.

3. Asterixis.

4. Encephalopathy, which is slowly improving.

5. Acute on chronic diastolic heart failure.



PLAN:  She is continuing care with peritoneal dialysis, IV antibiotics, and low-flow

oxygen.  I do not see any changes that need to be made to this regimen today.

Pulmonary will continue to follow. 







Job ID:  [id number]

## 2019-01-05 NOTE — PDOC.CTH
Cardiology Progress Note





- Subjective





The pt seen and examined.  No overnight events.  No cardiac complaints.  Per 

family, the pt is more alerted today. 





- Objective


 Vital Signs











  Temp Pulse Resp BP BP Pulse Ox


 


 01/05/19 08:00  99.4 F  82  20  170/73 H   93 L


 


 01/05/19 05:40   70   153/68 H  


 


 01/05/19 04:00  100.4 F H  67  19   195/84 H  97


 


 01/05/19 00:00  98.0 F  65  19   156/72 H  97








 











Admit Weight                   145 lb


 


Weight                         145 lb 8.081 oz














 











 01/04/19 01/05/19 01/06/19





 06:59 06:59 06:59


 


Intake Total 800  


 


Output Total 400 100 


 


Balance 400 -100 














- Physical Examination


Lungs: CTA (diminished at bases)


Heart: RRR


Abdomen: soft


Extremities: other: (edema to BLE.)





- Telemetry


Telemetry Rhythm: SR





- Labs


Result Diagrams: 


 01/04/19 23:13





 01/05/19 06:35


 Troponin/CKMB











CK-MB (CK-2)  7.3 ng/mL (0-6.6)  H*  12/30/18  08:49    


 


Troponin I  0.059 ng/mL (< 0.028)  H  12/30/18  14:42    














- Assessment/Plan





1. Paroxysmal afib with RVR - Remains in SR since 0800 on 01/04/2019; Not on 

Amiodarone at this moment.  Heparin drip is off for now and on ASA OK 300mg qd.

  Cont. to monitor on tele


2. Hx of Bradycardia - stable with no BBlocker or antiarrhythmics med; 


3. Altered mental status - stable per family


4. Labile HTN - cont. to monitor


5. ESRD with PD and HD with Lt tunnel cath - managed by nephrology service


6. Recent acute CVA


7. Possible SZ - 


8. DM type 2 - 


9. Chronic Anemia - stable


MAR reviewed 





* NPO


* DNAR








Review of Systems





- Review of Systems


Constitutional: reports: weakness


EENTM: reports: no symptoms reported


Respiratory: reports: no symptoms reported


Cardiac (ROS): reports: no symptoms reported


ABD/GI: reports: no symptoms reported


: reports: no symptoms reported

## 2019-01-05 NOTE — PRG
DATE OF SERVICE:  



SUBJECTIVE:  A 78-year-old female, being seen for end-stage renal disease.  The

patient tolerated hemodialysis well and can verbalize.  Denies any nausea, vomiting,

or chest pain. 



OBJECTIVE:  CONSTITUTIONAL:  The patient is awake and alert. 

VITAL SIGNS:  Afebrile, pulse 82, breathing 16, and blood pressure 153/68. 

GENERAL APPEARANCE AND MENTAL STATUS:  Fair. 

HEAD/NECK:  Normocephalic.   Atraumatic. 

EYES:  EOMI.  No deformity. 

EARS:  Clear.  No ulcers. 

NOSE:   Intact.  No lesions. 

MOUTH:  Clear.  No discharge. 

THROAT:  Clear.  No exudate. 

LUNGS:   Clear.  No crackles. 

CARDIAC:   S1, S2.  No rub. 

ABDOMEN:   Benign.  Bowel sounds positive. 

GENITALIA/RECTUM:  Parrish absent. 

BACK/EXTREMITIES:  Edema 0+.   

NEUROLOGICAL:  Alert and motor intact.                      

SKIN:   

LYMPHATICS:



LABORATORY DATA:  Showed hemoglobin 12.3.  BUN has decreased to 38.



ASSESSMENT:  

1. End-stage renal disease, stable.

2. Hypertension, stable.

3. Anemia, stable.

4. Medications based on GFR appropriate.  We will plan hemodialysis today.







Job ID:  [id number]

## 2019-01-06 VITALS — SYSTOLIC BLOOD PRESSURE: 97 MMHG | DIASTOLIC BLOOD PRESSURE: 46 MMHG | TEMPERATURE: 100.4 F

## 2019-01-06 LAB
BASOPHILS # BLD AUTO: 0 THOU/UL (ref 0–0.2)
BASOPHILS NFR BLD AUTO: 0.1 % (ref 0–1)
EOSINOPHIL # BLD AUTO: 0.1 THOU/UL (ref 0–0.7)
EOSINOPHIL NFR BLD AUTO: 0.8 % (ref 0–10)
HGB BLD-MCNC: 10.6 G/DL (ref 12–16)
LYMPHOCYTES # BLD: 0.4 THOU/UL (ref 1.2–3.4)
LYMPHOCYTES NFR BLD AUTO: 4.1 % (ref 21–51)
MCH RBC QN AUTO: 31.7 PG (ref 27–31)
MCV RBC AUTO: 100 FL (ref 78–98)
MONOCYTES # BLD AUTO: 0.7 THOU/UL (ref 0.11–0.59)
MONOCYTES NFR BLD AUTO: 7.3 % (ref 0–10)
NEUTROPHILS # BLD AUTO: 8.9 THOU/UL (ref 1.4–6.5)
NEUTROPHILS NFR BLD AUTO: 87.7 % (ref 42–75)
PLATELET # BLD AUTO: 155 THOU/UL (ref 130–400)
RBC # BLD AUTO: 3.35 MILL/UL (ref 4.2–5.4)
TROPONIN I SERPL DL<=0.01 NG/ML-MCNC: 0.28 NG/ML (ref ?–0.03)
WBC # BLD AUTO: 10.1 THOU/UL (ref 4.8–10.8)

## 2019-01-06 RX ADMIN — FAMOTIDINE SCH MG: 10 INJECTION, SOLUTION INTRAVENOUS at 08:14

## 2019-01-06 RX ADMIN — Medication PRN ML: at 02:34

## 2019-01-06 NOTE — CT
HEAD CT NONCONTRAST:

 

Date:  01/06/19 

 

COMPARISON:  

01/02/19. 

 

INDICATION:

CVA. History of aphasia, hemineglect, and slurred speech. 

 

FINDINGS:

Ventricular system is prominent, stable. There is moderate chronic microvascular ischemic disease of 
the cerebral white matter, which is stable. There is redemonstration of hypoattenuating foci of the l
eft cerebellar hemisphere compatible with lacunar infarctions. 

 

Since the prior exam, there has been development of hyperdensity of the francesca. 

 

IMPRESSION: 

1.  Interval development of pontine hyperdensity. This may relate to hemorrhagic infarction. 

 

2.  Redemonstration of chronic ischemic disease and lacunar infarctions. 

 

Notification of findings placed to the ordering hospitalist physician, via telephone at 0126 hours, 0
1/06/19. 

 

CODE CR. 

 

 

 

 

POS: [facility]

## 2019-01-06 NOTE — PDOC.PN
- Subjective


Encounter Start Date: 01/06/19


Encounter Start Time: 10:45





Patient was seen today in follow-up of acute CVA. The event of this morning 

were noted. She become less responsive, and code green was called. Stat CT scan 

revealed a bleed in the francesca. She has remained unresponsive and has marginal 

breathing.





- Objective


Resuscitation Status - Order Detail:





12/30/18 11:53


Resuscitation Status Routine 


   Resuscitation Status: DNAR: NO Resuscitation


   Discussed with: d/w POA  at bedside








MAR Reviewed: Yes


Vital Signs & Weight: 


 Vital Signs (12 hours)











  Temp Pulse Resp BP Pulse Ox


 


 01/06/19 12:00  100.4 F H  71  14  97/46 L  97


 


 01/06/19 08:14      98


 


 01/06/19 07:54   82  22 H   98


 


 01/06/19 05:38  99.9 F H  85  20  137/63  97








 Weight











Admit Weight                   145 lb


 


Weight                         145 lb 8.081 oz











 Most Recent Monitor Data











Heart Rate from ECG            67


 


NIBP                           154/78


 


NIBP BP-Mean                   103


 


Respiration from ECG           21


 


SpO2                           99














I&O: 


 











 01/05/19 01/06/19 01/07/19





 06:59 06:59 06:59


 


Intake Total  378.8 


 


Output Total 100 1650 


 


Balance -100 -1271.2 











Result Diagrams: 


 01/06/19 00:23





 01/05/19 06:35


Additional Labs: 


 Accuchecks











  01/05/19 01/05/19 01/05/19





  23:57 20:26 18:13


 


POC Glucose  295 H  258 H  139 H














Phys Exam





- Physical Examination


+ rhonchi bilaterally, and some rales at the bases


Cardiovascular: no significant murmur, no rub, irregular


Gastrointestinal: soft, non-tender, positive bowel sounds


Musculoskeletal: edema present





Dx/Plan


(1) Acute CVA (cerebrovascular accident)


Code(s): I63.9 - CEREBRAL INFARCTION, UNSPECIFIED   Status: Acute   Comment: 

embolic cva   





(2) Metabolic encephalopathy


Code(s): G93.41 - METABOLIC ENCEPHALOPATHY   Status: Acute   





(3) DM type 2 (diabetes mellitus, type 2)


Status: Chronic   


Qualifiers: 


   Diabetes mellitus long term insulin use: with long term use   Diabetes 

mellitus complication status: with kidney complications   Diabetes mellitus 

complication detail: with chronic kidney disease   Chronic kidney disease stage

: on chronic dialysis   Qualified Code(s): E11.22 - Type 2 diabetes mellitus 

with diabetic chronic kidney disease; N18.6 - End stage renal disease; Z79.4 - 

Long term (current) use of insulin; Z99.2 - Dependence on renal dialysis   





(4) ESRD (end stage renal disease) on dialysis


Code(s): N18.6 - END STAGE RENAL DISEASE; Z99.2 - DEPENDENCE ON RENAL DIALYSIS 

  Status: Chronic   Comment: on PD   





(5) HTN (hypertension)


Code(s): I10 - ESSENTIAL (PRIMARY) HYPERTENSION   Status: Chronic   


Qualifiers: 


   Hypertension type: essential hypertension   Qualified Code(s): I10 - 

Essential (primary) hypertension   





(6) Cerebral amyloid angiopathy


Code(s): E85.4 - ORGAN-LIMITED AMYLOIDOSIS; I68.0 - CEREBRAL AMYLOID ANGIOPATHY

   Status: Suspected   Comment: based on imaging   





(7) Bradycardia


Code(s): R00.1 - BRADYCARDIA, UNSPECIFIED   Status: Resolved   Comment: off 

dopamine and dobutamine   





- Plan





* Acute CVA- she unfortunately developed a hemorrhage into the francesca


* Agree with change care to Hospice, and I anticipate a rapid decline.

## 2019-01-06 NOTE — PDOC.EVN
Event Note





- Event Note


Event Note: 


got more obtunded overnight


CT brain wo contrast shows brainstem hemorrhage b/l


On exam: unresponsive with vacant stare, does not follow verbal stimuli


has mild decerebration in left UE


D/w  in person on floor, showed him CT images/bleed


D/w son in law Dr.Howard Levi gen surgeon in [place] and gave him an update 

about poor prognosis


Family have decided for comfort care


May get hospice eval if she lives this morning


5mg protamine given, heparin drip discontinued


No labs or active treatment except for comfort.

## 2019-01-06 NOTE — RAD
CHEST 1 VIEW:

 

Date:  01/06/19 

 

HISTORY:  

Cardiomegaly. 

 

COMPARISON: 

Radiograph prior day. 

 

FINDINGS:

Right IJ central venous catheter is similar. Dialysis catheter is similar. Heart size is enlarged. No
 pneumothorax. 

 

There appears to be a right subcoracoid shoulder dislocation. 

 

IMPRESSION: 

Right subcoracoid shoulder dislocation, otherwise similar examination of the chest. 

 

 

POS: [facility]

## 2019-01-06 NOTE — PRG
DATE OF SERVICE:  01/06/2019



SUBJECTIVE:  This patient developed altered mental status.  This morning, was taken

to CTs, and found to have a significant bleed in the area of the brainstem.  Family

is now moving toward hospice care. 



OBJECTIVE:  VITAL SIGNS:  Temperature is 99.9, pulse 80, respirations 20, and O2

saturation 98% on 2 L. 

GENERAL:  She is obtunded and will not respond to deep painful stimuli. 

HEENT:  Unremarkable. 

NECK:  No JVD. 

LUNGS:  She has coarse breath sounds from upper airway instability. 

ABDOMEN:  Soft. 

EXTREMITIES:  No edema.



LABORATORY DATA:  Labs showed white blood cell count of 10, hematocrit 33, and

platelet count 155.  No chemistry was done today. 



ASSESSMENT:  

1. Brainstem bleed.

2. Compromised respiratory status acute respiratory failure secondary to inability

to control secretions. 

3. End-stage renal disease.

4. Encephalopathy.



PLAN:  I agree with comfort care and I think that home hospice is appropriate.  No

further Pulmonary recommendations at this time. 







Job ID:  [id number]

## 2019-01-07 NOTE — DIS
DATE OF ADMISSION:  12/30/2018



DATE OF DISCHARGE:  01/06/2019



DISCHARGE DISPOSITION:  Inpatient hospice.



DISCHARGE DIAGNOSES:  

1. Acute cerebrovascular accident.

2. Hemorrhagic conversion of the infarct.

3. Hypertension.

4. End-stage renal disease, on peritoneal dialysis.

5. Diabetes mellitus type 2.

6. Dyslipidemia.

7. Hypertension.

8. Paroxysmal atrial fibrillation.

9. Hypothyroidism.



DISCHARGE MEDICATIONS:  None.



PROCEDURES AND MAJOR TESTS PERFORMED:  Include a CT scan of the brain showing no

intracranial abnormalities and this was on 12/30/2018.  The patient had an MRI of

the brain on 12/30/2018, demonstrating findings suggestive of an embolic phenomena

with small acute infarcts in the left frontal corona radiata, right parietal white

matter as well as the left cerebellar hemisphere compatible with acute infarction.

There were no cortical infarctions seen.  There was diffuse cerebral and cerebellar

volume loss and there were findings compatible with amyloid angiopathy.  The patient

also had an MR angiogram showing no focal stenosis and there was some sinus disease

involving the left sphenoid sinus.  The patient had a repeat CT scan on 01/02/2019,

this showed no intracranial hemorrhage.  There was some evidence of some prominent

small-vessel disease.  The patient had venous markings for vein mapping for dialysis

access.  The patient had an echocardiogram and this demonstrated an ejection

fraction estimated at 55% to 60%.  There was some concentric left ventricular

hypertrophy.  There was E to A flow reversal suggestive of diastolic dysfunction.

The patient had placement of a Trialysis catheter for dialysis.  The patient had a

repeat CT scan on 09/16/2018, this demonstrated interval development of pontine

hyperdensity which may relate to hemorrhagic infarction.  There is re-demonstration

of the chronic ischemic disease and lacunar infarcts. 



CODE STATUS:  DNAR.



ALLERGIES:  

1. IODINE.

2. SHELLFISH.



HOSPITAL COURSE:  Ms. De La Cruz is a very pleasant 78-year-old female who was admitted to

the hospital after her family noted that she was unresponsive and very difficult to

arouse.  He also noticed that she had a low heart rate.  He brought her to the

emergency room and she was admitted and placed on dopamine and dobutamine as well as

had a transcutaneous pacer placed.  She was admitted to the ICU.  A CT scan

initially did not show an infarct.  However, an MRI and MR angiogram were obtained

showing multiple embolic infarcts as previously described.  She was evaluated by

Neurology as well as Cardiology initially due to her bradycardia.  Eventually, she

was able to be weaned off the dobutamine and dopamine and moved out of the ICU.  She

was seen by Critical Care and Pulmonary Medicine due to her stay in the ICU.  It was

felt that the bradycardia was likely a result of the neurologic insult.  Due to the

infarct nature of the stroke, she was placed on a heparin drip.  She had

intermittent improvement in her mental status and efforts were initiated to consider

transitioning her over from peritoneal to hemodialysis, which had been a

consideration prior to her admission to the hospital.  However, during her hospital

stay, she developed an another episode of lethargy.  A Code Green was called and a

repeat CT scan was obtained.  This was negative for any acute change on January 2nd,

and shortly after obtaining the CT scan, her mental status improved.  However, a few

days later, she had a repeat decline in her mental status and an attempt was made to

consider transitioning her from peritoneal to hemodialysis in the event that there

was some metabolic encephalopathy complicating her mental status.  She actually

cleared up after the Trialysis catheter was placed before she even underwent

dialysis.  She underwent 2 sessions of hemodialysis and seemed to be improving.

However, on January 6th, early in the morning, she developed another episode of

decreased responsiveness.  Another code was called and she was taken emergently for

CT scan.  At this time, it was found that she had 

developed a hemorrhagic infarct in the pontine or in the francesca, and she did not

recover from this.  She was left extremely lethargic with apneic breathing, and the

family decided to discontinue active care and a hospice consult was obtained and she

was transferred to inpatient hospice on 01/06/2019. 







Job ID:  [id number]

## 2019-01-07 NOTE — EEG
*******************************************************************************
********************************************************************************
*****

Referring Physician: FATIMAH   

 *******************************************************************************
********************************************************************************
*****

EEG #   [id number]

TEST TYPE: ROUTINE PORTABLE INPATIENT



REPORT:



AN EEG USING THE [employer] TEN-TWENTY SYSTEM OF ELECTRODE PLACEMENT WAS 
PERFORMED.



The tracing opened with severe EMG artifact.  She eventually relaxed and the 
background activity appeared to be 8 hertz occipitally.  

Photic stimulation was unremarkable.  No epileptiform features were seen.







IMPRESSION:



THIS IS A LIMITED STUDY, BUT APPEARS TO BE NORMAL FOR AGE.  





Technician: KYRA

: EEG.JUDY ROY